# Patient Record
Sex: MALE | Race: WHITE | NOT HISPANIC OR LATINO
[De-identification: names, ages, dates, MRNs, and addresses within clinical notes are randomized per-mention and may not be internally consistent; named-entity substitution may affect disease eponyms.]

---

## 2017-03-16 ENCOUNTER — APPOINTMENT (OUTPATIENT)
Dept: UROLOGY | Facility: CLINIC | Age: 57
End: 2017-03-16

## 2017-03-16 VITALS
RESPIRATION RATE: 17 BRPM | DIASTOLIC BLOOD PRESSURE: 80 MMHG | BODY MASS INDEX: 32.58 KG/M2 | SYSTOLIC BLOOD PRESSURE: 127 MMHG | HEART RATE: 86 BPM | WEIGHT: 215 LBS | HEIGHT: 68 IN | TEMPERATURE: 98.2 F

## 2017-09-19 ENCOUNTER — APPOINTMENT (OUTPATIENT)
Dept: UROLOGY | Facility: CLINIC | Age: 57
End: 2017-09-19
Payer: COMMERCIAL

## 2017-09-19 VITALS
TEMPERATURE: 97.9 F | BODY MASS INDEX: 31.07 KG/M2 | WEIGHT: 205 LBS | SYSTOLIC BLOOD PRESSURE: 114 MMHG | HEART RATE: 66 BPM | HEIGHT: 68 IN | RESPIRATION RATE: 16 BRPM | DIASTOLIC BLOOD PRESSURE: 75 MMHG

## 2017-09-19 LAB
PSA FREE FLD-MCNC: 13.5
PSA FREE SERPL-MCNC: 1.29 NG/ML
PSA SERPL-MCNC: 9.57 NG/ML

## 2017-09-19 PROCEDURE — 99214 OFFICE O/P EST MOD 30 MIN: CPT

## 2017-10-03 ENCOUNTER — FORM ENCOUNTER (OUTPATIENT)
Age: 57
End: 2017-10-03

## 2017-10-04 ENCOUNTER — APPOINTMENT (OUTPATIENT)
Dept: MRI IMAGING | Facility: IMAGING CENTER | Age: 57
End: 2017-10-04
Payer: COMMERCIAL

## 2017-10-04 ENCOUNTER — OUTPATIENT (OUTPATIENT)
Dept: OUTPATIENT SERVICES | Facility: HOSPITAL | Age: 57
LOS: 1 days | End: 2017-10-04
Payer: COMMERCIAL

## 2017-10-04 DIAGNOSIS — R97.20 ELEVATED PROSTATE SPECIFIC ANTIGEN [PSA]: ICD-10-CM

## 2017-10-04 PROCEDURE — 82565 ASSAY OF CREATININE: CPT

## 2017-10-04 PROCEDURE — 72197 MRI PELVIS W/O & W/DYE: CPT

## 2017-10-04 PROCEDURE — 72197 MRI PELVIS W/O & W/DYE: CPT | Mod: 26

## 2017-10-04 PROCEDURE — A9585: CPT

## 2018-04-05 ENCOUNTER — APPOINTMENT (OUTPATIENT)
Dept: UROLOGY | Facility: CLINIC | Age: 58
End: 2018-04-05
Payer: COMMERCIAL

## 2018-04-05 VITALS
HEART RATE: 90 BPM | SYSTOLIC BLOOD PRESSURE: 147 MMHG | DIASTOLIC BLOOD PRESSURE: 76 MMHG | RESPIRATION RATE: 16 BRPM | TEMPERATURE: 98.1 F

## 2018-04-05 PROCEDURE — 99214 OFFICE O/P EST MOD 30 MIN: CPT

## 2018-04-16 LAB
PSA FREE FLD-MCNC: 13.6
PSA FREE SERPL-MCNC: 1.48 NG/ML
PSA SERPL-MCNC: 10.85 NG/ML

## 2018-07-10 ENCOUNTER — APPOINTMENT (OUTPATIENT)
Dept: UROLOGY | Facility: CLINIC | Age: 58
End: 2018-07-10
Payer: COMMERCIAL

## 2018-07-10 VITALS
SYSTOLIC BLOOD PRESSURE: 130 MMHG | HEART RATE: 69 BPM | RESPIRATION RATE: 16 BRPM | TEMPERATURE: 97.9 F | DIASTOLIC BLOOD PRESSURE: 79 MMHG

## 2018-07-10 LAB
PSA FREE FLD-MCNC: 14.8
PSA FREE SERPL-MCNC: 1.43 NG/ML
PSA SERPL-MCNC: 9.67 NG/ML

## 2018-07-10 PROCEDURE — 99213 OFFICE O/P EST LOW 20 MIN: CPT

## 2019-01-22 ENCOUNTER — APPOINTMENT (OUTPATIENT)
Dept: UROLOGY | Facility: CLINIC | Age: 59
End: 2019-01-22
Payer: COMMERCIAL

## 2019-01-22 VITALS
WEIGHT: 203 LBS | DIASTOLIC BLOOD PRESSURE: 75 MMHG | SYSTOLIC BLOOD PRESSURE: 142 MMHG | BODY MASS INDEX: 30.77 KG/M2 | HEIGHT: 68 IN | RESPIRATION RATE: 17 BRPM | TEMPERATURE: 98.3 F | HEART RATE: 84 BPM

## 2019-01-22 LAB
PSA FREE FLD-MCNC: 16 %
PSA FREE SERPL-MCNC: 1.48 NG/ML
PSA SERPL-MCNC: 9.39 NG/ML

## 2019-01-22 PROCEDURE — 99214 OFFICE O/P EST MOD 30 MIN: CPT

## 2019-01-22 NOTE — ASSESSMENT
[FreeTextEntry1] : Reviewed prior PSA results, MRI findings.\par Continues to have elevated PSA with low %free.\par Recommend repeat MRI prostate.  If continues to have suspicious foci, then will consider for repeat transperineal prostate biopsy.  Reviewed new biopsy technique and low risk of infection.\par All questions answered.\par Will call with results.

## 2019-01-22 NOTE — HISTORY OF PRESENT ILLNESS
[FreeTextEntry1] : s/p TRUS biopsy July 2015: benign\par s/p MRI/US fusion biopsy Aug 2016\par Pre biopsy PSA 7, abnormal MRI (volume 52 mL, PIRAD 4/3 lesions)\par Path: All cores benign, except for one core with atypical glands.  No definitive prostate cancer seen.\par \par MRI Oct 2017:  volume 39 mL, PIRAD 4 lesion unchanged from prior MRI.\par \par Returns for follow up.  PSA March 2018 10.85 ng/mL\par July 2018 9.67 ng/mL.\par January 2019: 9.39 ng/mL, 16% free.\par \par No new complaints.  Urinary function stable.\par No hematuria, dysuria or other s/s UTI.\par Remainder of medical hx unchanged.\par \par

## 2019-01-22 NOTE — PHYSICAL EXAM
[General Appearance - Well Developed] : well developed [General Appearance - Well Nourished] : well nourished [Normal Appearance] : normal appearance [Well Groomed] : well groomed [General Appearance - In No Acute Distress] : no acute distress [Abdomen Soft] : soft [Abdomen Tenderness] : non-tender [Costovertebral Angle Tenderness] : no ~M costovertebral angle tenderness [Urinary Bladder Findings] : the bladder was normal on palpation [No Prostate Nodules] : no prostate nodules [Prostate Size ___ gm] : prostate size [unfilled] gm [Edema] : no peripheral edema [] : no respiratory distress [Respiration, Rhythm And Depth] : normal respiratory rhythm and effort [Exaggerated Use Of Accessory Muscles For Inspiration] : no accessory muscle use [Normal Station and Gait] : the gait and station were normal for the patient's age

## 2019-02-06 ENCOUNTER — FORM ENCOUNTER (OUTPATIENT)
Age: 59
End: 2019-02-06

## 2019-02-07 ENCOUNTER — OUTPATIENT (OUTPATIENT)
Dept: OUTPATIENT SERVICES | Facility: HOSPITAL | Age: 59
LOS: 1 days | End: 2019-02-07
Payer: COMMERCIAL

## 2019-02-07 ENCOUNTER — APPOINTMENT (OUTPATIENT)
Dept: MRI IMAGING | Facility: IMAGING CENTER | Age: 59
End: 2019-02-07
Payer: COMMERCIAL

## 2019-02-07 DIAGNOSIS — R97.20 ELEVATED PROSTATE SPECIFIC ANTIGEN [PSA]: ICD-10-CM

## 2019-02-07 PROCEDURE — 72197 MRI PELVIS W/O & W/DYE: CPT | Mod: 26

## 2019-02-07 PROCEDURE — A9585: CPT

## 2019-02-07 PROCEDURE — 72197 MRI PELVIS W/O & W/DYE: CPT

## 2019-04-10 ENCOUNTER — APPOINTMENT (OUTPATIENT)
Dept: UROLOGY | Facility: AMBULATORY SURGERY CENTER | Age: 59
End: 2019-04-10

## 2019-04-17 ENCOUNTER — OUTPATIENT (OUTPATIENT)
Dept: OUTPATIENT SERVICES | Facility: HOSPITAL | Age: 59
LOS: 1 days | End: 2019-04-17
Payer: MEDICARE

## 2019-04-17 VITALS
SYSTOLIC BLOOD PRESSURE: 128 MMHG | RESPIRATION RATE: 16 BRPM | DIASTOLIC BLOOD PRESSURE: 76 MMHG | HEIGHT: 67 IN | HEART RATE: 63 BPM | TEMPERATURE: 98 F | WEIGHT: 201.94 LBS

## 2019-04-17 DIAGNOSIS — I10 ESSENTIAL (PRIMARY) HYPERTENSION: ICD-10-CM

## 2019-04-17 DIAGNOSIS — E11.9 TYPE 2 DIABETES MELLITUS WITHOUT COMPLICATIONS: ICD-10-CM

## 2019-04-17 DIAGNOSIS — R97.20 ELEVATED PROSTATE SPECIFIC ANTIGEN [PSA]: ICD-10-CM

## 2019-04-17 DIAGNOSIS — G47.33 OBSTRUCTIVE SLEEP APNEA (ADULT) (PEDIATRIC): ICD-10-CM

## 2019-04-17 LAB
ANION GAP SERPL CALC-SCNC: 16 MMO/L — HIGH (ref 7–14)
BUN SERPL-MCNC: 13 MG/DL — SIGNIFICANT CHANGE UP (ref 7–23)
CALCIUM SERPL-MCNC: 10 MG/DL — SIGNIFICANT CHANGE UP (ref 8.4–10.5)
CHLORIDE SERPL-SCNC: 102 MMOL/L — SIGNIFICANT CHANGE UP (ref 98–107)
CO2 SERPL-SCNC: 22 MMOL/L — SIGNIFICANT CHANGE UP (ref 22–31)
CREAT SERPL-MCNC: 0.97 MG/DL — SIGNIFICANT CHANGE UP (ref 0.5–1.3)
GLUCOSE SERPL-MCNC: 146 MG/DL — HIGH (ref 70–99)
HBA1C BLD-MCNC: 7.7 % — HIGH (ref 4–5.6)
HCT VFR BLD CALC: 46.6 % — SIGNIFICANT CHANGE UP (ref 39–50)
HGB BLD-MCNC: 14.1 G/DL — SIGNIFICANT CHANGE UP (ref 13–17)
MCHC RBC-ENTMCNC: 26.3 PG — LOW (ref 27–34)
MCHC RBC-ENTMCNC: 30.3 % — LOW (ref 32–36)
MCV RBC AUTO: 86.8 FL — SIGNIFICANT CHANGE UP (ref 80–100)
NRBC # FLD: 0 K/UL — SIGNIFICANT CHANGE UP (ref 0–0)
PLATELET # BLD AUTO: 240 K/UL — SIGNIFICANT CHANGE UP (ref 150–400)
PMV BLD: 12.5 FL — SIGNIFICANT CHANGE UP (ref 7–13)
POTASSIUM SERPL-MCNC: 4.2 MMOL/L — SIGNIFICANT CHANGE UP (ref 3.5–5.3)
POTASSIUM SERPL-SCNC: 4.2 MMOL/L — SIGNIFICANT CHANGE UP (ref 3.5–5.3)
RBC # BLD: 5.37 M/UL — SIGNIFICANT CHANGE UP (ref 4.2–5.8)
RBC # FLD: 12.2 % — SIGNIFICANT CHANGE UP (ref 10.3–14.5)
SODIUM SERPL-SCNC: 140 MMOL/L — SIGNIFICANT CHANGE UP (ref 135–145)
WBC # BLD: 7.65 K/UL — SIGNIFICANT CHANGE UP (ref 3.8–10.5)
WBC # FLD AUTO: 7.65 K/UL — SIGNIFICANT CHANGE UP (ref 3.8–10.5)

## 2019-04-17 PROCEDURE — 93010 ELECTROCARDIOGRAM REPORT: CPT

## 2019-04-17 NOTE — H&P PST ADULT - NSANTHOSAYNRD_GEN_A_CORE
No. CORTES screening performed.  STOP BANG Legend: 0-2 = LOW Risk; 3-4 = INTERMEDIATE Risk; 5-8 = HIGH Risk

## 2019-04-17 NOTE — H&P PST ADULT - HISTORY OF PRESENT ILLNESS
This is a 58 y.o. male with elevated PSA levels . Pt offers no complaints , now for transperineal MRI ultrasound fusion biopsy of the prostate .

## 2019-04-17 NOTE — H&P PST ADULT - TEACHING/LEARNING LEARNING PREFERENCES
individual instruction/pictorial/skill demonstration/written material/verbal instruction/video/computer/internet/audio/group instruction

## 2019-04-17 NOTE — H&P PST ADULT - NSICDXPROBLEM_GEN_ALL_CORE_FT
PROBLEM DIAGNOSES  Problem: Elevated prostate specific antigen (PSA)  Assessment and Plan: Transperineal MRI ultrasound fusion biopsy of the prostate     Problem: Hypertension  Assessment and Plan: pt instructed to take losartan day of surgery , monitor BP during hospital stay.    Problem: Diabetes mellitus  Assessment and Plan: type 2 , pt instructed to take last dose metformin 4/30 in am prior to surgery . Pt is npo from 11 pm the night before surgery . STAT FS upon admit , monitor FS during hospital stay . OR notified.    Problem: CORTES (obstructive sleep apnea)  Assessment and Plan: Stop bang positive ;   OR notified .

## 2019-04-17 NOTE — H&P PST ADULT - RS GEN PE MLT RESP DETAILS PC
no wheezes/clear to auscultation bilaterally/breath sounds equal/respirations non-labored/good air movement/no rales/no rhonchi

## 2019-04-17 NOTE — H&P PST ADULT - MUSCULOSKELETAL
details… detailed exam no joint warmth/no joint swelling/no calf tenderness/normal strength/no joint erythema/ROM intact

## 2019-04-17 NOTE — H&P PST ADULT - NS PRO FEM  PAP SMEARS 3YRS
[facility] [address]  [address] [address] [place] [address]  Phone: [phone]  Fax: [phone]    Bao Caraballo MD        October 31, 2018     Patient: Evelyne Swift   YOB: 1966   Date of Visit: 10/31/2018       To Whom it May Concern:    Evelyne Swift was seen in my clinic on 10/31/2018. Negative 0 mm TB test.    If you have any questions or concerns, please don't hesitate to call.     Sincerely,         Bao Caraballo MD
not applicable (Male)

## 2019-04-30 ENCOUNTER — TRANSCRIPTION ENCOUNTER (OUTPATIENT)
Age: 59
End: 2019-04-30

## 2019-05-01 ENCOUNTER — RESULT REVIEW (OUTPATIENT)
Age: 59
End: 2019-05-01

## 2019-05-01 ENCOUNTER — TRANSCRIPTION ENCOUNTER (OUTPATIENT)
Age: 59
End: 2019-05-01

## 2019-05-01 ENCOUNTER — APPOINTMENT (OUTPATIENT)
Dept: UROLOGY | Facility: AMBULATORY SURGERY CENTER | Age: 59
End: 2019-05-01

## 2019-05-01 ENCOUNTER — OUTPATIENT (OUTPATIENT)
Dept: OUTPATIENT SERVICES | Facility: HOSPITAL | Age: 59
LOS: 1 days | Discharge: HOME | End: 2019-05-01
Payer: COMMERCIAL

## 2019-05-01 VITALS
HEIGHT: 67 IN | DIASTOLIC BLOOD PRESSURE: 75 MMHG | SYSTOLIC BLOOD PRESSURE: 136 MMHG | HEART RATE: 59 BPM | WEIGHT: 201.94 LBS | RESPIRATION RATE: 18 BRPM | TEMPERATURE: 98 F | OXYGEN SATURATION: 100 %

## 2019-05-01 VITALS
TEMPERATURE: 98 F | SYSTOLIC BLOOD PRESSURE: 133 MMHG | DIASTOLIC BLOOD PRESSURE: 58 MMHG | HEART RATE: 78 BPM | OXYGEN SATURATION: 100 % | RESPIRATION RATE: 18 BRPM

## 2019-05-01 DIAGNOSIS — R97.20 ELEVATED PROSTATE SPECIFIC ANTIGEN [PSA]: ICD-10-CM

## 2019-05-01 LAB — GLUCOSE BLDC GLUCOMTR-MCNC: 122 MG/DL — HIGH (ref 70–99)

## 2019-05-01 PROCEDURE — 76872 US TRANSRECTAL: CPT | Mod: 26

## 2019-05-01 PROCEDURE — 88305 TISSUE EXAM BY PATHOLOGIST: CPT | Mod: 26

## 2019-05-01 PROCEDURE — 76942 ECHO GUIDE FOR BIOPSY: CPT | Mod: 26,59

## 2019-05-01 PROCEDURE — 88342 IMHCHEM/IMCYTCHM 1ST ANTB: CPT | Mod: 26

## 2019-05-01 PROCEDURE — 76377 3D RENDER W/INTRP POSTPROCES: CPT | Mod: 26

## 2019-05-01 PROCEDURE — 88341 IMHCHEM/IMCYTCHM EA ADD ANTB: CPT | Mod: 26

## 2019-05-01 PROCEDURE — 55706 BX PRST8 NDL SAT SAMPLING: CPT

## 2019-05-01 RX ORDER — SODIUM CHLORIDE 9 MG/ML
1000 INJECTION, SOLUTION INTRAVENOUS
Qty: 0 | Refills: 0 | Status: DISCONTINUED | OUTPATIENT
Start: 2019-05-01 | End: 2019-05-16

## 2019-05-01 RX ORDER — ASPIRIN/CALCIUM CARB/MAGNESIUM 324 MG
1 TABLET ORAL
Qty: 0 | Refills: 0 | COMMUNITY

## 2019-05-01 NOTE — ASU PREOP CHECKLIST - ISOLATION PRECAUTIONS
none

## 2019-05-01 NOTE — PROVIDER CONTACT NOTE (OTHER) - ACTION/TREATMENT ORDERED:
patient okay to be discharged from PACU, instructed patient to call Dr. Cavazos and head to ER if patient does not void in 6 hours.

## 2019-05-01 NOTE — ASU DISCHARGE PLAN (ADULT/PEDIATRIC) - MEDICATION INSTRUCTIONS
you were given tylenol in the operating room. you may take tylenol AFTER 10 pm today and every 6 horus after.

## 2019-05-01 NOTE — PROVIDER CONTACT NOTE (OTHER) - SITUATION
patient status post transperineal biopsy of prostate. patient has not voided, as per resident needed to void before discharge. [phone]

## 2019-05-01 NOTE — ASU DISCHARGE PLAN (ADULT/PEDIATRIC) - ASU DC SPECIAL INSTRUCTIONSFT
Dr. Cavazos will call you with your pathology results. Please call  if you have any questions.     Please temporarily stop aspirin. Please resume your aspirin on Saturday 5/4.    Please take Tylenol or Motrin for pain as needed.

## 2019-05-01 NOTE — ASU DISCHARGE PLAN (ADULT/PEDIATRIC) - PAIN MANAGEMENT
Prescriptions electronically submitted to pharmacy from [facility] Take over the counter pain medication

## 2019-05-01 NOTE — ASU PREOPERATIVE ASSESSMENT, ADULT ([facility] ONLY) - TEACHING/LEARNING LEARNING PREFERENCES
individual instruction/pictorial/skill demonstration/verbal instruction/computer/internet/group instruction/written material/audio/video

## 2019-05-09 LAB — SURGICAL PATHOLOGY STUDY: SIGNIFICANT CHANGE UP

## 2019-05-14 ENCOUNTER — OTHER (OUTPATIENT)
Age: 59
End: 2019-05-14

## 2019-05-15 PROBLEM — I10 ESSENTIAL (PRIMARY) HYPERTENSION: Chronic | Status: ACTIVE | Noted: 2019-04-17

## 2019-05-15 PROBLEM — E11.9 TYPE 2 DIABETES MELLITUS WITHOUT COMPLICATIONS: Chronic | Status: ACTIVE | Noted: 2019-04-17

## 2019-11-05 ENCOUNTER — APPOINTMENT (OUTPATIENT)
Dept: UROLOGY | Facility: CLINIC | Age: 59
End: 2019-11-05
Payer: COMMERCIAL

## 2019-11-05 VITALS
BODY MASS INDEX: 30.87 KG/M2 | DIASTOLIC BLOOD PRESSURE: 74 MMHG | WEIGHT: 203 LBS | HEART RATE: 65 BPM | TEMPERATURE: 98.1 F | SYSTOLIC BLOOD PRESSURE: 112 MMHG

## 2019-11-05 LAB
PSA FREE FLD-MCNC: 13 %
PSA FREE SERPL-MCNC: 1.58 NG/ML
PSA SERPL-MCNC: 12.2 NG/ML

## 2019-11-05 PROCEDURE — 99213 OFFICE O/P EST LOW 20 MIN: CPT

## 2019-11-05 NOTE — HISTORY OF PRESENT ILLNESS
[FreeTextEntry1] : s/p TRUS biopsy July 2015: benign\par s/p MRI/US fusion biopsy Aug 2016\par Pre biopsy PSA 7, abnormal MRI (volume 52 mL, PIRAD 4/3 lesions)\par Path: All cores benign, except for one core with atypical glands.  No definitive prostate cancer seen.\par \par MRI Oct 2017:  volume 39 mL, PIRAD 4 lesion unchanged from prior MRI.\par \par Returns for follow up.  PSA March 2018 10.85 ng/mL\par July 2018 9.67 ng/mL.\par January 2019: 9.39 ng/mL, 16% free.\par \par MRI Feb 2019: 45 mL volume, PIRAD 2\par \par PSA November 2019 12.2 ng/mL, 30% free.\par \par No other new complaints.  Urinary function mostly stable.  Has occasional dysuria and urgency but not overly bothersome.\par

## 2019-11-05 NOTE — ASSESSMENT
[FreeTextEntry1] : Reviewed PSA results.  Recommend start finasteride 5 mg once daily.  This should help his irritative voiding symptoms.  Repeat PSA in 6 months.  If he does not decline appropriately, then will consider repeat biopsy.\par Goals of medication reviewed.  Discussed the potential adverse effects of the medication.  Discussed the proper administration of the medication.\par

## 2020-07-07 ENCOUNTER — APPOINTMENT (OUTPATIENT)
Dept: UROLOGY | Facility: CLINIC | Age: 60
End: 2020-07-07
Payer: COMMERCIAL

## 2020-07-07 VITALS — TEMPERATURE: 98 F

## 2020-07-07 VITALS
WEIGHT: 205 LBS | HEIGHT: 68 IN | RESPIRATION RATE: 17 BRPM | HEART RATE: 93 BPM | SYSTOLIC BLOOD PRESSURE: 120 MMHG | BODY MASS INDEX: 31.07 KG/M2 | DIASTOLIC BLOOD PRESSURE: 78 MMHG

## 2020-07-07 LAB
PSA FREE FLD-MCNC: 10 %
PSA FREE SERPL-MCNC: 0.62 NG/ML
PSA SERPL-MCNC: 6.15 NG/ML

## 2020-07-07 PROCEDURE — 99213 OFFICE O/P EST LOW 20 MIN: CPT

## 2020-07-07 NOTE — ASSESSMENT
[FreeTextEntry1] : Doing well. \par Continue finasteride.\par Follow up in 6 months with repeat PSA f/t

## 2020-07-07 NOTE — HISTORY OF PRESENT ILLNESS
[FreeTextEntry1] : Fusion biopsy Aug 2016 \par Path: All cores benign, except for one core with atypical glands\par \par  PSA Nov 2019 12.2 ng/ml \par July 2020 6.15 ng/ml\par \par Currently on Finasteride, denies any urgency, denies any frequency. Denies any hematuria, dysuria or incontinence.  Urinary function has significant improved on medication\par

## 2020-10-01 ENCOUNTER — RX RENEWAL (OUTPATIENT)
Age: 60
End: 2020-10-01

## 2021-01-05 ENCOUNTER — APPOINTMENT (OUTPATIENT)
Dept: UROLOGY | Facility: CLINIC | Age: 61
End: 2021-01-05
Payer: COMMERCIAL

## 2021-01-05 VITALS
BODY MASS INDEX: 30.31 KG/M2 | HEART RATE: 96 BPM | SYSTOLIC BLOOD PRESSURE: 103 MMHG | TEMPERATURE: 97.5 F | DIASTOLIC BLOOD PRESSURE: 70 MMHG | WEIGHT: 200 LBS | RESPIRATION RATE: 17 BRPM | HEIGHT: 68 IN

## 2021-01-05 LAB
PSA FREE FLD-MCNC: 11 %
PSA FREE SERPL-MCNC: 0.74 NG/ML
PSA SERPL-MCNC: 6.67 NG/ML

## 2021-01-05 PROCEDURE — 99072 ADDL SUPL MATRL&STAF TM PHE: CPT

## 2021-01-05 PROCEDURE — 99213 OFFICE O/P EST LOW 20 MIN: CPT

## 2021-01-05 NOTE — ASSESSMENT
[FreeTextEntry1] : Continue finasteride 5 mg once daily.\par Rx renewed.\par Repeat PSA in 6 months, will call with results.\par Office visit in one year.

## 2021-01-05 NOTE — HISTORY OF PRESENT ILLNESS
[FreeTextEntry1] : Fusion biopsy Aug 2016 \par Path: All cores benign, except for one core with atypical glands\par \par  PSA Nov 2019 12.2 ng/ml \par July 2020 6.15 ng/ml\par December 2020 6.67 ng/mL \par \par Currently on Finasteride, denies any urgency, denies any frequency. Denies any hematuria, dysuria or incontinence. Reports urinary stream is good. Nocturia 0-1.

## 2021-10-19 ENCOUNTER — NON-APPOINTMENT (OUTPATIENT)
Age: 61
End: 2021-10-19

## 2022-01-10 ENCOUNTER — APPOINTMENT (OUTPATIENT)
Dept: UROLOGY | Facility: CLINIC | Age: 62
End: 2022-01-10
Payer: COMMERCIAL

## 2022-01-10 VITALS
WEIGHT: 200 LBS | RESPIRATION RATE: 17 BRPM | DIASTOLIC BLOOD PRESSURE: 80 MMHG | BODY MASS INDEX: 30.41 KG/M2 | HEART RATE: 93 BPM | SYSTOLIC BLOOD PRESSURE: 120 MMHG

## 2022-01-10 PROCEDURE — 99213 OFFICE O/P EST LOW 20 MIN: CPT

## 2022-01-10 RX ORDER — SITAGLIPTIN 50 MG/1
50 TABLET, FILM COATED ORAL
Qty: 30 | Refills: 0 | Status: ACTIVE | COMMUNITY
Start: 2020-11-04

## 2022-01-10 RX ORDER — HYDROCORTISONE 25 MG/G
2.5 CREAM TOPICAL
Qty: 30 | Refills: 0 | Status: ACTIVE | COMMUNITY
Start: 2022-01-04

## 2022-01-10 NOTE — HISTORY OF PRESENT ILLNESS
[FreeTextEntry1] : Patient is a 61 year old man, presents today for elevated PSA \par \par PSA January 4th 2022 6.50 ng/mL \par Sept 2021 PSA 6.09 ng/mL \par June 2021 5.9 ng/mL \par December 2020 6.6 ng/mL \par \par Reports stream is good. Remains on finasteride 5 mg daily. Denies any increased urgency or frequency. Denies any hematuria, dysuria or incontinence.

## 2022-01-10 NOTE — PHYSICAL EXAM
[General Appearance - Well Developed] : well developed [Normal Appearance] : normal appearance [Urethral Meatus] : meatus normal [Urinary Bladder Findings] : the bladder was normal on palpation [Testes Mass (___cm)] : there were no testicular masses [Heart Rate And Rhythm] : Heart rate and rhythm were normal [Edema] : no peripheral edema [] : no respiratory distress [Oriented To Time, Place, And Person] : oriented to person, place, and time [Affect] : the affect was normal [No Focal Deficits] : no focal deficits [No Prostate Nodules] : no prostate nodules

## 2022-01-10 NOTE — ASSESSMENT
[FreeTextEntry1] : Elevated PSA:  recommend MRI prostate w/wo IV contrast. Will call with results.\par \par BPH: continue finasteride 5 mg once daily.

## 2022-01-20 ENCOUNTER — APPOINTMENT (OUTPATIENT)
Dept: MRI IMAGING | Facility: IMAGING CENTER | Age: 62
End: 2022-01-20

## 2022-02-20 ENCOUNTER — RESULT REVIEW (OUTPATIENT)
Age: 62
End: 2022-02-20

## 2022-02-20 ENCOUNTER — APPOINTMENT (OUTPATIENT)
Dept: MRI IMAGING | Facility: IMAGING CENTER | Age: 62
End: 2022-02-20
Payer: COMMERCIAL

## 2022-02-20 ENCOUNTER — OUTPATIENT (OUTPATIENT)
Dept: OUTPATIENT SERVICES | Facility: HOSPITAL | Age: 62
LOS: 1 days | End: 2022-02-20
Payer: COMMERCIAL

## 2022-02-20 DIAGNOSIS — R97.20 ELEVATED PROSTATE SPECIFIC ANTIGEN [PSA]: ICD-10-CM

## 2022-02-20 PROCEDURE — 72197 MRI PELVIS W/O & W/DYE: CPT

## 2022-02-20 PROCEDURE — 76498P: CUSTOM | Mod: 26

## 2022-02-20 PROCEDURE — A9585: CPT

## 2022-02-20 PROCEDURE — 76498 UNLISTED MR PROCEDURE: CPT

## 2022-02-20 PROCEDURE — 72197 MRI PELVIS W/O & W/DYE: CPT | Mod: 26

## 2022-06-20 ENCOUNTER — RX RENEWAL (OUTPATIENT)
Age: 62
End: 2022-06-20

## 2022-10-18 ENCOUNTER — APPOINTMENT (OUTPATIENT)
Dept: UROLOGY | Facility: CLINIC | Age: 62
End: 2022-10-18

## 2022-10-18 VITALS
RESPIRATION RATE: 16 BRPM | DIASTOLIC BLOOD PRESSURE: 77 MMHG | TEMPERATURE: 98 F | BODY MASS INDEX: 30.62 KG/M2 | HEIGHT: 68 IN | WEIGHT: 202 LBS | SYSTOLIC BLOOD PRESSURE: 123 MMHG | HEART RATE: 97 BPM

## 2022-10-18 PROCEDURE — 99213 OFFICE O/P EST LOW 20 MIN: CPT

## 2022-10-18 NOTE — HISTORY OF PRESENT ILLNESS
[FreeTextEntry1] : Here for follow up.\par \par Fusion biopsy Aug 2016 \par Path: All cores benign, except for one core with atypical glands\par \par PSA Nov 2019 12.2 ng/ml \par July 2020 6.15 ng/ml\par December 2020 6.67 ng/mL \par September 2021 6.99 ng/mL\par September 2022: 7.59 ng/mL\par \par MRI prostate 2/20/2022 36 mL volume, no targetable lesions.\par \par BPH: Currently on Finasteride 5 mg once daily.\par Urinary function stable.\par Denies hematuria, dysuria or incontinence.

## 2022-10-18 NOTE — ASSESSMENT
[FreeTextEntry1] : Elevated PSA:  repeat PSA f/t in three months.\par JUAREZ: prostate normal, no nodules.\par Will call with PSA results, if continues to rise, then will need prostate biopsy.\par \par BPH: continue finasteride 5 mg once daily.

## 2022-10-18 NOTE — PHYSICAL EXAM
[General Appearance - Well Developed] : well developed [General Appearance - Well Nourished] : well nourished [Normal Appearance] : normal appearance [Well Groomed] : well groomed [General Appearance - In No Acute Distress] : no acute distress [Abdomen Soft] : soft [Abdomen Tenderness] : non-tender [Costovertebral Angle Tenderness] : no ~M costovertebral angle tenderness [No Prostate Nodules] : no prostate nodules [Prostate Size ___ gm] : prostate size [unfilled] gm

## 2023-01-19 ENCOUNTER — APPOINTMENT (OUTPATIENT)
Dept: UROLOGY | Facility: CLINIC | Age: 63
End: 2023-01-19
Payer: COMMERCIAL

## 2023-01-19 VITALS
WEIGHT: 205 LBS | SYSTOLIC BLOOD PRESSURE: 118 MMHG | DIASTOLIC BLOOD PRESSURE: 76 MMHG | HEART RATE: 97 BPM | RESPIRATION RATE: 16 BRPM | BODY MASS INDEX: 31.07 KG/M2 | HEIGHT: 68 IN | TEMPERATURE: 98.4 F

## 2023-01-19 DIAGNOSIS — N40.0 BENIGN PROSTATIC HYPERPLASIA WITHOUT LOWER URINARY TRACT SYMPMS: ICD-10-CM

## 2023-01-19 LAB
PSA FREE FLD-MCNC: 11 %
PSA FREE SERPL-MCNC: 0.87 NG/ML
PSA SERPL-MCNC: 7.95 NG/ML

## 2023-01-19 PROCEDURE — 99213 OFFICE O/P EST LOW 20 MIN: CPT

## 2023-01-22 NOTE — PHYSICAL EXAM
[General Appearance - Well Developed] : well developed [General Appearance - Well Nourished] : well nourished [Normal Appearance] : normal appearance [Well Groomed] : well groomed [General Appearance - In No Acute Distress] : no acute distress [Abdomen Soft] : soft [Abdomen Tenderness] : non-tender [Costovertebral Angle Tenderness] : no ~M costovertebral angle tenderness [Urinary Bladder Findings] : the bladder was normal on palpation [No Prostate Nodules] : no prostate nodules

## 2023-01-22 NOTE — HISTORY OF PRESENT ILLNESS
[FreeTextEntry1] : Here for 3 month follow up.\par \par Fusion biopsy Aug 2016 \par Path: All cores benign, except for one core with atypical glands\par \par PSA Nov 2019 12.2 ng/ml \par July 2020 6.15 ng/ml\par December 2020 6.67 ng/mL \par September 2021 6.99 ng/mL\par September 2022: 7.59 ng/mL\par January 2023: 7.95 ng/mL, 11% free\par \par MRI prostate 2/20/2022 36 mL volume, no targetable lesions.\par \par BPH: Currently on Finasteride 5 mg once daily.\par Urinary function stable.\par Denies hematuria, dysuria or incontinence.

## 2023-01-22 NOTE — ASSESSMENT
[FreeTextEntry1] : PSA results reviewed.\par PSA continues to rise while on finasteride.\par MRI prostate ordered. Will call with results.\par Will need either fusion or standard template prostate biopsy.\par

## 2023-02-10 ENCOUNTER — RESULT REVIEW (OUTPATIENT)
Age: 63
End: 2023-02-10

## 2023-02-10 ENCOUNTER — APPOINTMENT (OUTPATIENT)
Dept: MRI IMAGING | Facility: IMAGING CENTER | Age: 63
End: 2023-02-10
Payer: COMMERCIAL

## 2023-02-10 ENCOUNTER — OUTPATIENT (OUTPATIENT)
Dept: OUTPATIENT SERVICES | Facility: HOSPITAL | Age: 63
LOS: 1 days | End: 2023-02-10
Payer: COMMERCIAL

## 2023-02-10 DIAGNOSIS — R97.20 ELEVATED PROSTATE SPECIFIC ANTIGEN [PSA]: ICD-10-CM

## 2023-02-10 PROCEDURE — 76498P: CUSTOM | Mod: 26

## 2023-02-10 PROCEDURE — A9585: CPT

## 2023-02-10 PROCEDURE — 76498 UNLISTED MR PROCEDURE: CPT

## 2023-02-10 PROCEDURE — 72197 MRI PELVIS W/O & W/DYE: CPT | Mod: 26

## 2023-02-10 PROCEDURE — 72197 MRI PELVIS W/O & W/DYE: CPT

## 2023-02-18 ENCOUNTER — NON-APPOINTMENT (OUTPATIENT)
Age: 63
End: 2023-02-18

## 2023-03-14 ENCOUNTER — NON-APPOINTMENT (OUTPATIENT)
Age: 63
End: 2023-03-14

## 2023-03-21 ENCOUNTER — APPOINTMENT (OUTPATIENT)
Dept: UROLOGY | Facility: CLINIC | Age: 63
End: 2023-03-21
Payer: COMMERCIAL

## 2023-03-21 ENCOUNTER — OUTPATIENT (OUTPATIENT)
Dept: OUTPATIENT SERVICES | Facility: HOSPITAL | Age: 63
LOS: 1 days | End: 2023-03-21
Payer: COMMERCIAL

## 2023-03-21 VITALS
SYSTOLIC BLOOD PRESSURE: 121 MMHG | OXYGEN SATURATION: 99 % | HEART RATE: 98 BPM | TEMPERATURE: 97.5 F | DIASTOLIC BLOOD PRESSURE: 81 MMHG | RESPIRATION RATE: 16 BRPM

## 2023-03-21 DIAGNOSIS — R35.0 FREQUENCY OF MICTURITION: ICD-10-CM

## 2023-03-21 PROCEDURE — 76377 3D RENDER W/INTRP POSTPROCES: CPT | Mod: 26

## 2023-03-21 PROCEDURE — 55700: CPT

## 2023-03-21 PROCEDURE — 76942 ECHO GUIDE FOR BIOPSY: CPT | Mod: 26,59

## 2023-03-21 PROCEDURE — 55700: CPT | Mod: 22

## 2023-03-21 PROCEDURE — 76942 ECHO GUIDE FOR BIOPSY: CPT | Mod: 59

## 2023-03-24 ENCOUNTER — NON-APPOINTMENT (OUTPATIENT)
Age: 63
End: 2023-03-24

## 2023-03-28 DIAGNOSIS — R97.20 ELEVATED PROSTATE SPECIFIC ANTIGEN [PSA]: ICD-10-CM

## 2023-03-31 DIAGNOSIS — R97.20 ELEVATED PROSTATE, SPECIFIC ANTIGEN [PSA]: ICD-10-CM

## 2023-03-31 LAB — PROSTATE BIOPSY: NORMAL

## 2023-04-17 ENCOUNTER — APPOINTMENT (OUTPATIENT)
Dept: NUCLEAR MEDICINE | Facility: IMAGING CENTER | Age: 63
End: 2023-04-17
Payer: COMMERCIAL

## 2023-04-17 ENCOUNTER — OUTPATIENT (OUTPATIENT)
Dept: OUTPATIENT SERVICES | Facility: HOSPITAL | Age: 63
LOS: 1 days | End: 2023-04-17
Payer: COMMERCIAL

## 2023-04-17 DIAGNOSIS — Z00.8 ENCOUNTER FOR OTHER GENERAL EXAMINATION: ICD-10-CM

## 2023-04-17 DIAGNOSIS — C61 MALIGNANT NEOPLASM OF PROSTATE: ICD-10-CM

## 2023-04-17 PROCEDURE — 78816 PET IMAGE W/CT FULL BODY: CPT | Mod: 26

## 2023-04-17 PROCEDURE — A9595: CPT

## 2023-04-17 PROCEDURE — 78816 PET IMAGE W/CT FULL BODY: CPT

## 2023-05-04 ENCOUNTER — APPOINTMENT (OUTPATIENT)
Dept: UROLOGY | Facility: CLINIC | Age: 63
End: 2023-05-04
Payer: COMMERCIAL

## 2023-05-04 VITALS
BODY MASS INDEX: 31.07 KG/M2 | SYSTOLIC BLOOD PRESSURE: 132 MMHG | HEART RATE: 104 BPM | TEMPERATURE: 98.4 F | HEIGHT: 68 IN | RESPIRATION RATE: 17 BRPM | WEIGHT: 205 LBS | DIASTOLIC BLOOD PRESSURE: 76 MMHG

## 2023-05-04 PROCEDURE — 99215 OFFICE O/P EST HI 40 MIN: CPT

## 2023-05-04 RX ORDER — DIAZEPAM 5 MG/1
5 TABLET ORAL
Qty: 1 | Refills: 0 | Status: COMPLETED | COMMUNITY
Start: 2023-02-15 | End: 2023-05-04

## 2023-05-24 ENCOUNTER — OUTPATIENT (OUTPATIENT)
Dept: OUTPATIENT SERVICES | Facility: HOSPITAL | Age: 63
LOS: 1 days | Discharge: HOME | End: 2023-05-24

## 2023-05-24 NOTE — DISEASE MANAGEMENT
[1] : T1 [c] : c [0] : M0 [10-20] : 10 - 20 ng/mL [Biopsy with Fusion] : Patient had a biopsy with fusion on [8] : Template Biopsy Jake Score: 8 [7(4+3)] : Fusion Biopsy Bluefield Score: 7(4+3) [Biopsy results sent to PCP/Referring Physician] : Biopsy results sent to PCP/Referring Physician [] : Patient had no Bone Scan performed [BiopsyDate] : 3/21/2023 [TotalCores] : 14 [TotalPositiveCores] : 4 [MaxCoreInvolvement] : 40% [FreeTextEntry7] : MRI prostate 2/10/2023 37 mL volume, PIRAD 4 Right PZpm, PIRAD 4 Left PZpm.\par PSA at diagnosis 7.95 ng/mL on finasteride.\par \par PSMA PET/CT 4/17/2023: uptake in the prostate, no metastatic disease. [IIC] : IIC

## 2023-05-24 NOTE — HISTORY OF PRESENT ILLNESS
[FreeTextEntry1] : Here for prostate cancer treatment discussion.\par \par Fusion biopsy Aug 2016 \par Path: All cores benign, except for one core with atypical glands\par \par PSA Nov 2019 12.2 ng/ml \par July 2020 6.15 ng/ml\par December 2020 6.67 ng/mL \par September 2021 6.99 ng/mL\par September 2022: 7.59 ng/mL\par January 2023: 7.95 ng/mL, 11% free\par \par MRI prostate 2/20/2022 36 mL volume, no targetable lesions.\par MRI prostate 2/10/2023 37 mL volume, PIRAD 4 Right PZpm, PIRAD 4 Left PZpm.\par \par Fusion biopsy demonstrated Gl Gr 3/4 prostate cancer.\par No metastatic disease.

## 2023-05-24 NOTE — DISEASE MANAGEMENT
[1] : T1 [c] : c [0] : M0 [10-20] : 10 - 20 ng/mL [Biopsy with Fusion] : Patient had a biopsy with fusion on [8] : Template Biopsy Jake Score: 8 [7(4+3)] : Fusion Biopsy Esbon Score: 7(4+3) [Biopsy results sent to PCP/Referring Physician] : Biopsy results sent to PCP/Referring Physician [] : Patient had no Bone Scan performed [BiopsyDate] : 3/21/2023 [TotalCores] : 14 [TotalPositiveCores] : 4 [MaxCoreInvolvement] : 40% [FreeTextEntry7] : MRI prostate 2/10/2023 37 mL volume, PIRAD 4 Right PZpm, PIRAD 4 Left PZpm.\par PSA at diagnosis 7.95 ng/mL on finasteride.\par \par PSMA PET/CT 4/17/2023: uptake in the prostate, no metastatic disease. [IIC] : IIC

## 2023-06-08 ENCOUNTER — APPOINTMENT (OUTPATIENT)
Dept: RADIATION ONCOLOGY | Facility: CLINIC | Age: 63
End: 2023-06-08
Payer: COMMERCIAL

## 2023-06-08 VITALS
DIASTOLIC BLOOD PRESSURE: 83 MMHG | RESPIRATION RATE: 18 BRPM | OXYGEN SATURATION: 97 % | SYSTOLIC BLOOD PRESSURE: 140 MMHG | HEART RATE: 105 BPM | BODY MASS INDEX: 31.49 KG/M2 | WEIGHT: 207.12 LBS

## 2023-06-08 DIAGNOSIS — Z82.49 FAMILY HISTORY OF ISCHEMIC HEART DISEASE AND OTHER DISEASES OF THE CIRCULATORY SYSTEM: ICD-10-CM

## 2023-06-08 DIAGNOSIS — Z83.3 FAMILY HISTORY OF DIABETES MELLITUS: ICD-10-CM

## 2023-06-08 PROCEDURE — 99204 OFFICE O/P NEW MOD 45 MIN: CPT | Mod: 25

## 2023-06-18 NOTE — PHYSICAL EXAM
[Sclera] : the sclera and conjunctiva were normal [Outer Ear] : the ears and nose were normal in appearance [] : no respiratory distress [Heart Rate And Rhythm] : heart rate and rhythm were normal [Abdomen Soft] : soft [Nondistended] : nondistended [Normal] : oriented to person, place and time, the affect was normal, the mood was normal and not anxious

## 2023-06-18 NOTE — REVIEW OF SYSTEMS
[IPSS Score (0-40): ___] : IPSS score: [unfilled] [EPIC-CP Score (0-60): ___] : EPIC-CP score: [unfilled] [Negative] : Neurological [Urinary Incontinence: Grade 0] : Urinary Incontinence: Grade 0  [Urinary Retention: Grade 0] : Urinary Retention: Grade 0 [Constipation: Grade 0] : Constipation: Grade 0 [Diarrhea: Grade 0] : Diarrhea: Grade 0 [Proctitis: Grade 0] : Proctitis: Grade 0 [Urinary Tract Pain: Grade 0] : Urinary Tract Pain: Grade 0 [Urinary Urgency: Grade 1 - Present] : Urinary Urgency: Grade 1 - Present [Urinary Frequency: Grade 0] : Urinary Frequency: Grade 0 [Ejaculation Disorder: Grade 0] : Ejaculation Disorder: Grade 0 [Erectile Dysfunction: Grade 0] : Erectile Dysfunction: Grade 0 [Fever] : no fever [Chills] : no chills [Night Sweats] : no night sweats [Nocturia] : no nocturia [Confused] : no confusion [Dizziness] : no dizziness

## 2023-06-18 NOTE — DISEASE MANAGEMENT
[1] : T1 [c] : c [0] : M0 [0-10] : 0 -10 ng/mL [Biopsy with Fusion] : Patient had a biopsy with fusion on [8] : Template Biopsy Jake Score: 8 [] : Patient had a Prostate MRI [4] : 4 [IIC] : IIC [BiopsyDate] : 03/23 [MeasuredProstateVolume] : 37 [TotalCores] : 14 [TotalPositiveCores] : 5 [MaxCoreInvolvement] : 40%

## 2023-06-18 NOTE — HISTORY OF PRESENT ILLNESS
[FreeTextEntry1] : Mr. Ashraf is a 61 yo man with prostate cancer being evaluated for radiation therapy. \par \par Patient was referred to Dr. Cavazos initially in October 2014 for evaluation of elevated PSA of 4.9. The PSA increased to 13 and he underwent a prostate biopsy in 2015, that showed no cancer. Prostate MRI in 2016 showed 4 suspicious lesions. Prostate biopsy on 8/11/16 again showed no cancer. All targeted biopsies were benign, except for focal high grade PIN from lesion #3 which was in the right anterior peripheral zone. Prostate MRI in 2017 showed two lesions and relative stability. MRI in 2019 showed one lesion, PIRADS 2. MRI prostate in 3/2022 showed no targetable prostate lesion. PIRADS 2.\par \par PSA on 1/19/23 was 7.95 ng/mL, up from 6.15 on 7/6/20. Historical high was 12.20 on 11/4/19. . \par \par MRI prostate on 2/10/2023 showed a 37 mL prostate. A lesion was noted in the right, posterior medial base to mid-gland peripheral zone measuring 4 mm. A second lesions was noted in the left posterior  medial midgland to apex peripheral zone. Both lesions were PIRADS 4. There was no extracapsular extension. There was no seminal vesicle invasion and the neurovascular bundles were unremarkable. There was no pelvic adenopathy and no suspicious osseous lesions. \par \par Prostate biopsy on 3/21/23 showed prostate adenocarcinoma. Targeted biopsy of lesion 1 showed Worthington score 4+3=7 adenocarcinoma involving 21% of 1/4 cores, 80% Worthington 4 component; and lesion 2 showed benign tissue. Template biopsy showed Worthington 4+4=8 from the right posterior lateral apex in 28% of 1/1/ core and from the left anterior involving 5% of 1/1/ core. There was a Worthington 3+3=6 adenocarcinoma from the left posterior medial apex involving 28% of 1/2 cores and from the left lateral involv ing 40% of 1/1 core. The remaining cores were benign.\par \par PET PSMA on 4/17/23 showed foci of increased radiotracer activity in bilateral prostate gland, most intense in the right anterolateral base, extending to the apex, suggesting high PSMA expression. There was no evidence of PSMA-positive metastatic disease.\par \par Today, he feels overall well. Denies significant stress. Notes very mild frequency and intermittency of stream, but is not very bothered by this. IPSS 2 and EPIC 1.

## 2023-07-10 ENCOUNTER — OUTPATIENT (OUTPATIENT)
Dept: OUTPATIENT SERVICES | Facility: HOSPITAL | Age: 63
LOS: 1 days | End: 2023-07-10
Payer: COMMERCIAL

## 2023-07-10 VITALS
HEART RATE: 84 BPM | SYSTOLIC BLOOD PRESSURE: 130 MMHG | TEMPERATURE: 99 F | HEIGHT: 67 IN | DIASTOLIC BLOOD PRESSURE: 81 MMHG | RESPIRATION RATE: 16 BRPM | WEIGHT: 205.91 LBS | OXYGEN SATURATION: 98 %

## 2023-07-10 DIAGNOSIS — I10 ESSENTIAL (PRIMARY) HYPERTENSION: ICD-10-CM

## 2023-07-10 DIAGNOSIS — C61 MALIGNANT NEOPLASM OF PROSTATE: ICD-10-CM

## 2023-07-10 DIAGNOSIS — E11.9 TYPE 2 DIABETES MELLITUS WITHOUT COMPLICATIONS: ICD-10-CM

## 2023-07-10 LAB
A1C WITH ESTIMATED AVERAGE GLUCOSE RESULT: 9.7 % — HIGH (ref 4–5.6)
ALBUMIN SERPL ELPH-MCNC: 4.7 G/DL — SIGNIFICANT CHANGE UP (ref 3.3–5)
ALP SERPL-CCNC: 71 U/L — SIGNIFICANT CHANGE UP (ref 40–120)
ALT FLD-CCNC: 43 U/L — HIGH (ref 4–41)
ANION GAP SERPL CALC-SCNC: 13 MMOL/L — SIGNIFICANT CHANGE UP (ref 7–14)
AST SERPL-CCNC: 31 U/L — SIGNIFICANT CHANGE UP (ref 4–40)
BILIRUB SERPL-MCNC: 0.6 MG/DL — SIGNIFICANT CHANGE UP (ref 0.2–1.2)
BLD GP AB SCN SERPL QL: NEGATIVE — SIGNIFICANT CHANGE UP
BUN SERPL-MCNC: 8 MG/DL — SIGNIFICANT CHANGE UP (ref 7–23)
CALCIUM SERPL-MCNC: 9.8 MG/DL — SIGNIFICANT CHANGE UP (ref 8.4–10.5)
CHLORIDE SERPL-SCNC: 99 MMOL/L — SIGNIFICANT CHANGE UP (ref 98–107)
CO2 SERPL-SCNC: 26 MMOL/L — SIGNIFICANT CHANGE UP (ref 22–31)
CREAT SERPL-MCNC: 0.85 MG/DL — SIGNIFICANT CHANGE UP (ref 0.5–1.3)
EGFR: 98 ML/MIN/1.73M2 — SIGNIFICANT CHANGE UP
ESTIMATED AVERAGE GLUCOSE: 232 — SIGNIFICANT CHANGE UP
GLUCOSE SERPL-MCNC: 183 MG/DL — HIGH (ref 70–99)
HCT VFR BLD CALC: 46.3 % — SIGNIFICANT CHANGE UP (ref 39–50)
HGB BLD-MCNC: 14.5 G/DL — SIGNIFICANT CHANGE UP (ref 13–17)
MCHC RBC-ENTMCNC: 26.1 PG — LOW (ref 27–34)
MCHC RBC-ENTMCNC: 31.3 GM/DL — LOW (ref 32–36)
MCV RBC AUTO: 83.3 FL — SIGNIFICANT CHANGE UP (ref 80–100)
NRBC # BLD: 0 /100 WBCS — SIGNIFICANT CHANGE UP (ref 0–0)
NRBC # FLD: 0 K/UL — SIGNIFICANT CHANGE UP (ref 0–0)
PLATELET # BLD AUTO: 273 K/UL — SIGNIFICANT CHANGE UP (ref 150–400)
POTASSIUM SERPL-MCNC: 4.4 MMOL/L — SIGNIFICANT CHANGE UP (ref 3.5–5.3)
POTASSIUM SERPL-SCNC: 4.4 MMOL/L — SIGNIFICANT CHANGE UP (ref 3.5–5.3)
PROT SERPL-MCNC: 7.9 G/DL — SIGNIFICANT CHANGE UP (ref 6–8.3)
RBC # BLD: 5.56 M/UL — SIGNIFICANT CHANGE UP (ref 4.2–5.8)
RBC # FLD: 12.5 % — SIGNIFICANT CHANGE UP (ref 10.3–14.5)
RH IG SCN BLD-IMP: POSITIVE — SIGNIFICANT CHANGE UP
SODIUM SERPL-SCNC: 138 MMOL/L — SIGNIFICANT CHANGE UP (ref 135–145)
WBC # BLD: 9.65 K/UL — SIGNIFICANT CHANGE UP (ref 3.8–10.5)
WBC # FLD AUTO: 9.65 K/UL — SIGNIFICANT CHANGE UP (ref 3.8–10.5)

## 2023-07-10 PROCEDURE — 93010 ELECTROCARDIOGRAM REPORT: CPT

## 2023-07-10 RX ORDER — METFORMIN HYDROCHLORIDE 850 MG/1
1 TABLET ORAL
Qty: 0 | Refills: 0 | DISCHARGE

## 2023-07-10 RX ORDER — SODIUM CHLORIDE 9 MG/ML
1000 INJECTION, SOLUTION INTRAVENOUS
Refills: 0 | Status: DISCONTINUED | OUTPATIENT
Start: 2023-07-17 | End: 2023-07-17

## 2023-07-10 RX ORDER — MELOXICAM 15 MG/1
1 TABLET ORAL
Qty: 0 | Refills: 0 | DISCHARGE

## 2023-07-10 RX ORDER — SIMVASTATIN 20 MG/1
1 TABLET, FILM COATED ORAL
Qty: 0 | Refills: 0 | DISCHARGE

## 2023-07-10 RX ORDER — LOSARTAN POTASSIUM 100 MG/1
1 TABLET, FILM COATED ORAL
Qty: 0 | Refills: 0 | DISCHARGE

## 2023-07-10 RX ORDER — LORATADINE 10 MG/1
1 TABLET ORAL
Qty: 0 | Refills: 0 | DISCHARGE

## 2023-07-10 NOTE — H&P PST ADULT - PROBLEM SELECTOR PLAN 2
Pt instructed to continue losartan as prescribed. Pt instructed to continue losartan as prescribed.  CORTES precautions.

## 2023-07-10 NOTE — H&P PST ADULT - HISTORY OF PRESENT ILLNESS
63 yo male with preop dx. malignant neoplasm of prostate presents to pre surgical testing.  Pt with elevated PSA, now s/p prostate biopsy revealing malignancy.  Pt is scheduled for robotic assisted laparoscopic radical prostatectomy, pelvic lymph node dissection.

## 2023-07-10 NOTE — H&P PST ADULT - PROBLEM SELECTOR PLAN 1
Pt is scheduled for robotic assisted laparoscopic radical prostatectomy, pelvic lymph node dissection on 7/17/23.  Verbal and written pre op instructions reviewed with patient and pt able to verbalize understanding.   Verbal and written instructions given with chlorhexidine wash, pt able to verbalize understanding via teach back method.

## 2023-07-10 NOTE — H&P PST ADULT - NSICDXPASTMEDICALHX_GEN_ALL_CORE_FT
PAST MEDICAL HISTORY:  Diabetes mellitus     HLD (hyperlipidemia)     HTN (hypertension)     Hypertension     Malignant neoplasm of prostate

## 2023-07-10 NOTE — H&P PST ADULT - NEGATIVE NEUROLOGICAL SYMPTOMS
no transient paralysis/no weakness/no paresthesias/no generalized seizures/no focal seizures/no tremors/no vertigo/no loss of sensation/no difficulty walking

## 2023-07-11 LAB
CULTURE RESULTS: SIGNIFICANT CHANGE UP
SPECIMEN SOURCE: SIGNIFICANT CHANGE UP

## 2023-07-17 ENCOUNTER — INPATIENT (INPATIENT)
Facility: HOSPITAL | Age: 63
LOS: 0 days | Discharge: HOME | End: 2023-07-17
Attending: UROLOGY | Admitting: UROLOGY

## 2023-07-17 ENCOUNTER — APPOINTMENT (OUTPATIENT)
Dept: UROLOGY | Facility: HOSPITAL | Age: 63
End: 2023-07-17

## 2023-07-17 VITALS — HEIGHT: 67 IN | WEIGHT: 205.91 LBS

## 2023-07-17 DIAGNOSIS — C61 MALIGNANT NEOPLASM OF PROSTATE: ICD-10-CM

## 2023-07-17 LAB
GLUCOSE BLDC GLUCOMTR-MCNC: 144 MG/DL — HIGH (ref 70–99)
RH IG SCN BLD-IMP: POSITIVE — SIGNIFICANT CHANGE UP

## 2023-07-20 PROBLEM — C61 MALIGNANT NEOPLASM OF PROSTATE: Chronic | Status: ACTIVE | Noted: 2023-07-10

## 2023-07-20 PROBLEM — I10 ESSENTIAL (PRIMARY) HYPERTENSION: Chronic | Status: ACTIVE | Noted: 2023-07-10

## 2023-07-20 PROBLEM — E78.5 HYPERLIPIDEMIA, UNSPECIFIED: Chronic | Status: ACTIVE | Noted: 2023-07-10

## 2023-08-07 ENCOUNTER — OUTPATIENT (OUTPATIENT)
Dept: OUTPATIENT SERVICES | Facility: HOSPITAL | Age: 63
LOS: 1 days | End: 2023-08-07

## 2023-08-07 VITALS
RESPIRATION RATE: 18 BRPM | HEIGHT: 68 IN | DIASTOLIC BLOOD PRESSURE: 80 MMHG | WEIGHT: 199.96 LBS | HEART RATE: 81 BPM | OXYGEN SATURATION: 96 % | TEMPERATURE: 97 F | SYSTOLIC BLOOD PRESSURE: 128 MMHG

## 2023-08-07 DIAGNOSIS — I10 ESSENTIAL (PRIMARY) HYPERTENSION: ICD-10-CM

## 2023-08-07 DIAGNOSIS — Z91.89 OTHER SPECIFIED PERSONAL RISK FACTORS, NOT ELSEWHERE CLASSIFIED: ICD-10-CM

## 2023-08-07 DIAGNOSIS — E11.9 TYPE 2 DIABETES MELLITUS WITHOUT COMPLICATIONS: ICD-10-CM

## 2023-08-07 DIAGNOSIS — C61 MALIGNANT NEOPLASM OF PROSTATE: ICD-10-CM

## 2023-08-07 DIAGNOSIS — Z98.890 OTHER SPECIFIED POSTPROCEDURAL STATES: Chronic | ICD-10-CM

## 2023-08-07 LAB
A1C WITH ESTIMATED AVERAGE GLUCOSE RESULT: 8 % — HIGH (ref 4–5.6)
ALBUMIN SERPL ELPH-MCNC: 4.6 G/DL — SIGNIFICANT CHANGE UP (ref 3.3–5)
ALP SERPL-CCNC: 65 U/L — SIGNIFICANT CHANGE UP (ref 40–120)
ALT FLD-CCNC: 29 U/L — SIGNIFICANT CHANGE UP (ref 4–41)
ANION GAP SERPL CALC-SCNC: 12 MMOL/L — SIGNIFICANT CHANGE UP (ref 7–14)
AST SERPL-CCNC: 27 U/L — SIGNIFICANT CHANGE UP (ref 4–40)
BILIRUB SERPL-MCNC: 0.5 MG/DL — SIGNIFICANT CHANGE UP (ref 0.2–1.2)
BLD GP AB SCN SERPL QL: NEGATIVE — SIGNIFICANT CHANGE UP
BUN SERPL-MCNC: 8 MG/DL — SIGNIFICANT CHANGE UP (ref 7–23)
CALCIUM SERPL-MCNC: 9.7 MG/DL — SIGNIFICANT CHANGE UP (ref 8.4–10.5)
CHLORIDE SERPL-SCNC: 103 MMOL/L — SIGNIFICANT CHANGE UP (ref 98–107)
CO2 SERPL-SCNC: 25 MMOL/L — SIGNIFICANT CHANGE UP (ref 22–31)
CREAT SERPL-MCNC: 0.93 MG/DL — SIGNIFICANT CHANGE UP (ref 0.5–1.3)
EGFR: 93 ML/MIN/1.73M2 — SIGNIFICANT CHANGE UP
ESTIMATED AVERAGE GLUCOSE: 183 — SIGNIFICANT CHANGE UP
GLUCOSE SERPL-MCNC: 131 MG/DL — HIGH (ref 70–99)
POTASSIUM SERPL-MCNC: 4.4 MMOL/L — SIGNIFICANT CHANGE UP (ref 3.5–5.3)
POTASSIUM SERPL-SCNC: 4.4 MMOL/L — SIGNIFICANT CHANGE UP (ref 3.5–5.3)
PROT SERPL-MCNC: 7.7 G/DL — SIGNIFICANT CHANGE UP (ref 6–8.3)
RH IG SCN BLD-IMP: POSITIVE — SIGNIFICANT CHANGE UP
SODIUM SERPL-SCNC: 140 MMOL/L — SIGNIFICANT CHANGE UP (ref 135–145)

## 2023-08-07 RX ORDER — SODIUM CHLORIDE 9 MG/ML
3 INJECTION INTRAMUSCULAR; INTRAVENOUS; SUBCUTANEOUS EVERY 8 HOURS
Refills: 0 | Status: DISCONTINUED | OUTPATIENT
Start: 2023-08-17 | End: 2023-08-18

## 2023-08-07 RX ORDER — SITAGLIPTIN 50 MG/1
1 TABLET, FILM COATED ORAL
Refills: 0 | DISCHARGE

## 2023-08-07 RX ORDER — MILK THISTLE 150 MG
2 CAPSULE ORAL
Refills: 0 | DISCHARGE
End: 2023-07-10

## 2023-08-07 NOTE — H&P PST ADULT - PROBLEM SELECTOR PLAN 2
Monitor BS A.M of surgery. Pt instructed to hold Jardiance 72 hours prior to surgery, last dose 07/13/23. Pt also instructed not to take any diabetes medications on the day of surgery. Pt verbalized understanding.

## 2023-08-07 NOTE — H&P PST ADULT - RESPIRATORY
clear to auscultation bilaterally/no wheezes/no respiratory distress/airway patent/breath sounds equal/good air movement/respirations non-labored

## 2023-08-07 NOTE — H&P PST ADULT - PROBLEM SELECTOR PLAN 1
Schedule for robotic assisted laparoscopic radical prostatectomy, pelvic lymph node dissection tentatively on 08/17/23. Pre op instructions, famotidine, chlorhexidine gluconate soap given and explained. Pt verbalized understanding.

## 2023-08-07 NOTE — H&P PST ADULT - NEGATIVE ENMT SYMPTOMS
Dr Neal Moyer requested arom  cx 6cm/80/-1 vtx  arom clear no hearing difficulty/no ear pain/no tinnitus/no vertigo/no sinus symptoms/no nasal congestion/no nose bleeds/no throat pain/no dysphagia

## 2023-08-07 NOTE — H&P PST ADULT - HISTORY OF PRESENT ILLNESS
61 y/o male with H/O: HTN, DM type2, HLD    preop dx. malignant neoplasm of prostate presents to pre surgical testing.  Pt with elevated PSA, now s/p prostate biopsy revealing malignancy.  Pt is scheduled for robotic assisted laparoscopic radical prostatectomy, pelvic lymph node dissection.  61 y/o male with H/O: HTN, DM type2, HLD presents to PST for pre op evaluation with h/o elevated PSA, s/p prostate biopsy revealing malignancy. Pre op diagnosis: malignant neoplasm of prostate. Now schedule for robotic assisted laparoscopic radical prostatectomy, pelvic lymph node dissection tentatively on 08/17/23.  Pt was initially scheduled for same surgery last month, as per pt, surgery was postponed because he was started on Jardiance after PST visit and took it on the day of surgery.

## 2023-08-07 NOTE — H&P PST ADULT - NSICDXPASTSURGICALHX_GEN_ALL_CORE_FT
PAST SURGICAL HISTORY:  No significant past surgical history PAST SURGICAL HISTORY:  H/O prostate biopsy

## 2023-08-07 NOTE — H&P PST ADULT - NEGATIVE NEUROLOGICAL SYMPTOMS
no transient paralysis/no weakness/no paresthesias/no generalized seizures/no focal seizures/no tremors/no vertigo/no loss of sensation/no difficulty walking/no headache/no confusion

## 2023-08-09 LAB
CULTURE RESULTS: SIGNIFICANT CHANGE UP
SPECIMEN SOURCE: SIGNIFICANT CHANGE UP

## 2023-08-12 NOTE — ASU DISCHARGE PLAN (ADULT/PEDIATRIC) - CALL YOUR DOCTOR IF YOU HAVE ANY OF THE FOLLOWING:
Fever greater than (need to indicate Fahrenheit or Celsius)/Bleeding that does not stop Fever greater than (need to indicate Fahrenheit or Celsius)/Bleeding that does not stop/Unable to urinate No

## 2023-08-16 ENCOUNTER — TRANSCRIPTION ENCOUNTER (OUTPATIENT)
Age: 63
End: 2023-08-16

## 2023-08-16 NOTE — ASU PATIENT PROFILE, ADULT - FALL HARM RISK - UNIVERSAL INTERVENTIONS
Bed in lowest position, wheels locked, appropriate side rails in place/Call bell, personal items and telephone in reach/Instruct patient to call for assistance before getting out of bed or chair/Non-slip footwear when patient is out of bed/[place] to call system/Physically safe environment - no spills, clutter or unnecessary equipment/Purposeful Proactive Rounding/Room/bathroom lighting operational, light cord in reach

## 2023-08-16 NOTE — ASU PATIENT PROFILE, ADULT - ANESTHESIA, PREVIOUS REACTION, PROFILE
[de-identified] : Pt returns for follow up for her right knee pain, pt was denied for her Synvisc injections. Evidently even though the pt has  a dx. of tri compartmental osteo arthritis. and her adult daily living is being compromised, because of the pain she is experiencing, in normal daily activities she is requesting an injection.\par \par Under sterile technique using ultrasound guided needle placement the patient's right knee was injected with 40 mg of cortisone with lidocaine. The patient tolerated the procedure well and was discharged with no immediate complications. Patient was given appropriate followup instructions accordingly. none

## 2023-08-17 ENCOUNTER — APPOINTMENT (OUTPATIENT)
Dept: UROLOGY | Facility: HOSPITAL | Age: 63
End: 2023-08-17

## 2023-08-17 ENCOUNTER — RESULT REVIEW (OUTPATIENT)
Age: 63
End: 2023-08-17

## 2023-08-17 ENCOUNTER — INPATIENT (INPATIENT)
Facility: HOSPITAL | Age: 63
LOS: 0 days | Discharge: HOME | End: 2023-08-18
Attending: UROLOGY | Admitting: UROLOGY
Payer: COMMERCIAL

## 2023-08-17 VITALS
OXYGEN SATURATION: 97 % | RESPIRATION RATE: 18 BRPM | HEART RATE: 78 BPM | HEIGHT: 68 IN | WEIGHT: 199.96 LBS | SYSTOLIC BLOOD PRESSURE: 114 MMHG | TEMPERATURE: 98 F | DIASTOLIC BLOOD PRESSURE: 74 MMHG

## 2023-08-17 DIAGNOSIS — C61 MALIGNANT NEOPLASM OF PROSTATE: ICD-10-CM

## 2023-08-17 DIAGNOSIS — Z98.890 OTHER SPECIFIED POSTPROCEDURAL STATES: Chronic | ICD-10-CM

## 2023-08-17 LAB
GLUCOSE BLDC GLUCOMTR-MCNC: 145 MG/DL — HIGH (ref 70–99)
GLUCOSE BLDC GLUCOMTR-MCNC: 149 MG/DL — HIGH (ref 70–99)
GLUCOSE BLDC GLUCOMTR-MCNC: 179 MG/DL — HIGH (ref 70–99)
GLUCOSE BLDC GLUCOMTR-MCNC: 198 MG/DL — HIGH (ref 70–99)
GLUCOSE BLDC GLUCOMTR-MCNC: 215 MG/DL — HIGH (ref 70–99)

## 2023-08-17 PROCEDURE — 55866 LAPS SURG PRST8ECT RPBIC RAD: CPT

## 2023-08-17 PROCEDURE — 88309 TISSUE EXAM BY PATHOLOGIST: CPT | Mod: 26

## 2023-08-17 PROCEDURE — 88307 TISSUE EXAM BY PATHOLOGIST: CPT | Mod: 26

## 2023-08-17 PROCEDURE — 88305 TISSUE EXAM BY PATHOLOGIST: CPT | Mod: 26

## 2023-08-17 PROCEDURE — 38571 LAPAROSCOPY LYMPHADENECTOMY: CPT

## 2023-08-17 DEVICE — LIGATING CLIPS WECK HEMOLOK POLYMER LARGE (PURPLE) 6: Type: IMPLANTABLE DEVICE | Status: FUNCTIONAL

## 2023-08-17 DEVICE — SURGICEL 2 X 14": Type: IMPLANTABLE DEVICE | Status: FUNCTIONAL

## 2023-08-17 RX ORDER — SENNA PLUS 8.6 MG/1
2 TABLET ORAL AT BEDTIME
Refills: 0 | Status: DISCONTINUED | OUTPATIENT
Start: 2023-08-17 | End: 2023-08-18

## 2023-08-17 RX ORDER — GLUCAGON INJECTION, SOLUTION 0.5 MG/.1ML
1 INJECTION, SOLUTION SUBCUTANEOUS ONCE
Refills: 0 | Status: DISCONTINUED | OUTPATIENT
Start: 2023-08-17 | End: 2023-08-18

## 2023-08-17 RX ORDER — HYDROMORPHONE HYDROCHLORIDE 2 MG/ML
0.5 INJECTION INTRAMUSCULAR; INTRAVENOUS; SUBCUTANEOUS
Refills: 0 | Status: DISCONTINUED | OUTPATIENT
Start: 2023-08-17 | End: 2023-08-17

## 2023-08-17 RX ORDER — SODIUM CHLORIDE 9 MG/ML
1000 INJECTION, SOLUTION INTRAVENOUS
Refills: 0 | Status: DISCONTINUED | OUTPATIENT
Start: 2023-08-17 | End: 2023-08-18

## 2023-08-17 RX ORDER — OXYBUTYNIN CHLORIDE 5 MG
5 TABLET ORAL THREE TIMES A DAY
Refills: 0 | Status: DISCONTINUED | OUTPATIENT
Start: 2023-08-17 | End: 2023-08-18

## 2023-08-17 RX ORDER — INSULIN LISPRO 100/ML
VIAL (ML) SUBCUTANEOUS AT BEDTIME
Refills: 0 | Status: DISCONTINUED | OUTPATIENT
Start: 2023-08-17 | End: 2023-08-18

## 2023-08-17 RX ORDER — DEXTROSE 50 % IN WATER 50 %
25 SYRINGE (ML) INTRAVENOUS ONCE
Refills: 0 | Status: DISCONTINUED | OUTPATIENT
Start: 2023-08-17 | End: 2023-08-18

## 2023-08-17 RX ORDER — METFORMIN HYDROCHLORIDE 850 MG/1
1 TABLET ORAL
Refills: 0 | DISCHARGE

## 2023-08-17 RX ORDER — SIMVASTATIN 20 MG/1
20 TABLET, FILM COATED ORAL AT BEDTIME
Refills: 0 | Status: DISCONTINUED | OUTPATIENT
Start: 2023-08-17 | End: 2023-08-18

## 2023-08-17 RX ORDER — KETOROLAC TROMETHAMINE 30 MG/ML
15 SYRINGE (ML) INJECTION EVERY 6 HOURS
Refills: 0 | Status: DISCONTINUED | OUTPATIENT
Start: 2023-08-17 | End: 2023-08-17

## 2023-08-17 RX ORDER — LIDOCAINE 4 G/100G
1 CREAM TOPICAL
Refills: 0 | Status: DISCONTINUED | OUTPATIENT
Start: 2023-08-17 | End: 2023-08-18

## 2023-08-17 RX ORDER — SIMVASTATIN 20 MG/1
1 TABLET, FILM COATED ORAL
Refills: 0 | DISCHARGE

## 2023-08-17 RX ORDER — POLYETHYLENE GLYCOL 3350 17 G/17G
17 POWDER, FOR SOLUTION ORAL DAILY
Refills: 0 | Status: DISCONTINUED | OUTPATIENT
Start: 2023-08-17 | End: 2023-08-18

## 2023-08-17 RX ORDER — SODIUM CHLORIDE 9 MG/ML
1000 INJECTION, SOLUTION INTRAVENOUS
Refills: 0 | Status: DISCONTINUED | OUTPATIENT
Start: 2023-08-17 | End: 2023-08-17

## 2023-08-17 RX ORDER — DEXTROSE 50 % IN WATER 50 %
12.5 SYRINGE (ML) INTRAVENOUS ONCE
Refills: 0 | Status: DISCONTINUED | OUTPATIENT
Start: 2023-08-17 | End: 2023-08-18

## 2023-08-17 RX ORDER — LOSARTAN POTASSIUM 100 MG/1
100 TABLET, FILM COATED ORAL DAILY
Refills: 0 | Status: DISCONTINUED | OUTPATIENT
Start: 2023-08-18 | End: 2023-08-18

## 2023-08-17 RX ORDER — ONDANSETRON 8 MG/1
4 TABLET, FILM COATED ORAL ONCE
Refills: 0 | Status: DISCONTINUED | OUTPATIENT
Start: 2023-08-17 | End: 2023-08-17

## 2023-08-17 RX ORDER — INSULIN LISPRO 100/ML
VIAL (ML) SUBCUTANEOUS
Refills: 0 | Status: DISCONTINUED | OUTPATIENT
Start: 2023-08-17 | End: 2023-08-18

## 2023-08-17 RX ORDER — OXYCODONE HYDROCHLORIDE 5 MG/1
5 TABLET ORAL EVERY 6 HOURS
Refills: 0 | Status: DISCONTINUED | OUTPATIENT
Start: 2023-08-17 | End: 2023-08-18

## 2023-08-17 RX ORDER — DEXTROSE 50 % IN WATER 50 %
15 SYRINGE (ML) INTRAVENOUS ONCE
Refills: 0 | Status: DISCONTINUED | OUTPATIENT
Start: 2023-08-17 | End: 2023-08-18

## 2023-08-17 RX ORDER — ACETAMINOPHEN 500 MG
975 TABLET ORAL EVERY 6 HOURS
Refills: 0 | Status: DISCONTINUED | OUTPATIENT
Start: 2023-08-17 | End: 2023-08-18

## 2023-08-17 RX ORDER — SITAGLIPTIN 50 MG/1
1 TABLET, FILM COATED ORAL
Refills: 0 | DISCHARGE

## 2023-08-17 RX ORDER — HEPARIN SODIUM 5000 [USP'U]/ML
5000 INJECTION INTRAVENOUS; SUBCUTANEOUS EVERY 8 HOURS
Refills: 0 | Status: DISCONTINUED | OUTPATIENT
Start: 2023-08-17 | End: 2023-08-18

## 2023-08-17 RX ORDER — KETOROLAC TROMETHAMINE 30 MG/ML
15 SYRINGE (ML) INJECTION EVERY 6 HOURS
Refills: 0 | Status: COMPLETED | OUTPATIENT
Start: 2023-08-17 | End: 2023-08-19

## 2023-08-17 RX ADMIN — HEPARIN SODIUM 5000 UNIT(S): 5000 INJECTION INTRAVENOUS; SUBCUTANEOUS at 17:21

## 2023-08-17 RX ADMIN — LIDOCAINE 1 APPLICATION(S): 4 CREAM TOPICAL at 17:21

## 2023-08-17 RX ADMIN — SIMVASTATIN 20 MILLIGRAM(S): 20 TABLET, FILM COATED ORAL at 22:09

## 2023-08-17 RX ADMIN — SENNA PLUS 2 TABLET(S): 8.6 TABLET ORAL at 22:08

## 2023-08-17 RX ADMIN — LIDOCAINE 1 APPLICATION(S): 4 CREAM TOPICAL at 22:09

## 2023-08-17 RX ADMIN — Medication 2: at 15:18

## 2023-08-17 RX ADMIN — SODIUM CHLORIDE 3 MILLILITER(S): 9 INJECTION INTRAMUSCULAR; INTRAVENOUS; SUBCUTANEOUS at 14:09

## 2023-08-17 RX ADMIN — Medication 975 MILLIGRAM(S): at 17:22

## 2023-08-17 RX ADMIN — SODIUM CHLORIDE 3 MILLILITER(S): 9 INJECTION INTRAMUSCULAR; INTRAVENOUS; SUBCUTANEOUS at 22:09

## 2023-08-17 RX ADMIN — POLYETHYLENE GLYCOL 3350 17 GRAM(S): 17 POWDER, FOR SOLUTION ORAL at 17:22

## 2023-08-17 RX ADMIN — Medication 975 MILLIGRAM(S): at 17:52

## 2023-08-17 RX ADMIN — Medication 15 MILLIGRAM(S): at 18:04

## 2023-08-17 NOTE — PATIENT PROFILE ADULT - FALL HARM RISK - HARM RISK INTERVENTIONS

## 2023-08-17 NOTE — PRE-OP CHECKLIST - BP NONINVASIVE SYSTOLIC (MM HG)
08/03/21        Barrington Celaya  [id number]  [facility] [address]        Dear Barrington Celaya    We are pleased to report normal/stable results from the following studies performed during your last visit. The ultrasound notes a solitary thyroid nodule(Location: left lower; Size: 1.1 cm. It does not need or qualify for biopsy however we should repeat the ultrasound in a year to make sure that has not grown in size.    Resulted Orders   US THYROID ONLY    Narrative    EXAM: US THYROID ONLY    CLINICAL INFORMATION: Nontoxic single thyroid nodule    TECHNIQUE: Multiple grayscale and color Doppler images of the thyroid.     COMPARISON: None.    FINDINGS:    Right Thyroid Lobe:     Size: 4.3 x 1.2 x 1.4 cm.     Echotexture/Vascularity: Normal.    Left Thyroid Lobe:     Size: 4.4 x 1.5 x 1.1 cm.      Echotexture/Vascularity: Normal.    Isthmus: 2.1 cm.      NODULES:  Based on TI-RADS criteria, only the 4 most suspicious nodules will be  described.    ------------------------------    Nodule 1:        Label on Images: 1       Location: left lower       Size: 1.1 cm       Composition: solid/almost completely solid (2)       Echogenicity: hypoechoic (2)       Shape: not taller-than-wide (0)       Margins: smooth (0)        Echogenic foci: None. (0)         ACR TI-RADS total points: 4       ACR TI-RADS risk category: TR4 (4-6 Points)    Abnormal lymph nodes or extra-thyroidal mass: None.    ---------------------------------  ACR TI-RADS recommendations:  TR5 (>= 7 points) - FNA if >= 1 cm, follow-up if 0.5-0.9 cm every year for  5 years  TR4 (4-6 points) - FNA if >= 1.5 cm, follow-up if 1-1.4 cm in 1, 2, 3 and 5  years  TR3 (3 points) - FNA if >= 2.5 cm, follow-up if 1.5-2.4 cm in 1, 3 and 5  years  TR2 (2 points) and TR1 (0 points) - No FNA or follow-up    * ACR TI-RADS recommends that no more than two nodules with the highest ACR  TI-RADS total point should be biopsied and no more than four nodules should  be  followed.    *Clinically significant interval growth defined as >20% increase size in  two nodule dimension and the change in size must be greater than or equal  to 2mm.    NORMAN Hair, et al, 'ACR Thyroid Imaging, Reporting, and Data System  (TI-RADS): White Paper of the ACR TI-RADS Committee', March 2017.  ------------------------------------------        Impression    IMPRESSION:     Small solitary nonspecific thyroid nodule on the left. This nodule does not  meet TI-RADS criteria for FNA, but does meet criteria for one year imaging  follow-up.         [x] Continue on your current medications          If you have any questions about your test results please call our office Monday through Friday from 8:00 am. to 5:00 pm.    Sincerely,    Jere Hernandez MD  [address] [place] [address] [address] [place] [address] [phone]                                         114

## 2023-08-17 NOTE — CHART NOTE - NSCHARTNOTEFT_GEN_A_CORE
Post op Check: 63 yo M POD #0 RALP/LND    Pt seen and examined without complaints. Pain is controlled. Denies SOB/CP/N/V.     Vital Signs Last 24 Hrs  T(C): 36.7 (17 Aug 2023 15:57), Max: 36.7 (17 Aug 2023 15:57)  T(F): 98 (17 Aug 2023 15:57), Max: 98 (17 Aug 2023 15:57)  HR: 78 (17 Aug 2023 15:57) (71 - 78)  BP: 97/49 (17 Aug 2023 15:57) (96/52 - 114/74)  BP(mean): 73 (17 Aug 2023 15:00) (63 - 77)  RR: 18 (17 Aug 2023 15:57) (15 - 18)  SpO2: 98% (17 Aug 2023 15:57) (95% - 100%)    Parameters below as of 17 Aug 2023 15:57  Patient On (Oxygen Delivery Method): room air        I&Os Summary  Montes: 145 light translucent punch  17 Aug 2023 07:01  -  17 Aug 2023 16:42  --------------------------------------------------------  IN: 250 mL / OUT: 145 mL / NET: 105 mL        Physical Exam  Gen: NAD  Pulm: No respiratory distress, clear to auscultation  CV: Reg  Abd: midline dressing with small amount of strike through, steris c/d/i soft, NT, ND  : montes secured  Venodynes: In place                Plan:   IVF: LR @125  Diet: CLD  Labs: None  Abx: None  Strict I&Os  Analgesia and antiemetics as needed  DVT prophylaxis/OOB/Incentive spirometry

## 2023-08-18 ENCOUNTER — TRANSCRIPTION ENCOUNTER (OUTPATIENT)
Age: 63
End: 2023-08-18

## 2023-08-18 VITALS
HEART RATE: 78 BPM | DIASTOLIC BLOOD PRESSURE: 66 MMHG | RESPIRATION RATE: 18 BRPM | OXYGEN SATURATION: 98 % | SYSTOLIC BLOOD PRESSURE: 107 MMHG | TEMPERATURE: 98 F

## 2023-08-18 LAB
GLUCOSE BLDC GLUCOMTR-MCNC: 125 MG/DL — HIGH (ref 70–99)
GLUCOSE BLDC GLUCOMTR-MCNC: 166 MG/DL — HIGH (ref 70–99)
GLUCOSE BLDC GLUCOMTR-MCNC: 173 MG/DL — HIGH (ref 70–99)

## 2023-08-18 RX ORDER — IBUPROFEN 200 MG
2 TABLET ORAL
Qty: 0 | Refills: 0 | DISCHARGE

## 2023-08-18 RX ORDER — ACETAMINOPHEN 500 MG
3 TABLET ORAL
Qty: 0 | Refills: 0 | DISCHARGE
Start: 2023-08-18

## 2023-08-18 RX ORDER — SODIUM CHLORIDE 9 MG/ML
1000 INJECTION, SOLUTION INTRAVENOUS ONCE
Refills: 0 | Status: COMPLETED | OUTPATIENT
Start: 2023-08-18 | End: 2023-08-18

## 2023-08-18 RX ORDER — FINASTERIDE 5 MG/1
1 TABLET, FILM COATED ORAL
Refills: 0 | DISCHARGE

## 2023-08-18 RX ORDER — EMPAGLIFLOZIN 10 MG/1
1 TABLET, FILM COATED ORAL
Qty: 0 | Refills: 0 | DISCHARGE

## 2023-08-18 RX ORDER — LOSARTAN POTASSIUM 100 MG/1
1 TABLET, FILM COATED ORAL
Refills: 0 | DISCHARGE

## 2023-08-18 RX ORDER — LIDOCAINE 4 G/100G
1 CREAM TOPICAL
Qty: 0 | Refills: 0 | DISCHARGE
Start: 2023-08-18

## 2023-08-18 RX ORDER — SODIUM CHLORIDE 9 MG/ML
1000 INJECTION, SOLUTION INTRAVENOUS
Refills: 0 | Status: DISCONTINUED | OUTPATIENT
Start: 2023-08-18 | End: 2023-08-18

## 2023-08-18 RX ORDER — POLYETHYLENE GLYCOL 3350 17 G/17G
17 POWDER, FOR SOLUTION ORAL
Qty: 0 | Refills: 0 | DISCHARGE
Start: 2023-08-18

## 2023-08-18 RX ADMIN — SODIUM CHLORIDE 3 MILLILITER(S): 9 INJECTION INTRAMUSCULAR; INTRAVENOUS; SUBCUTANEOUS at 13:02

## 2023-08-18 RX ADMIN — LIDOCAINE 1 APPLICATION(S): 4 CREAM TOPICAL at 03:42

## 2023-08-18 RX ADMIN — Medication 975 MILLIGRAM(S): at 14:05

## 2023-08-18 RX ADMIN — Medication 15 MILLIGRAM(S): at 15:40

## 2023-08-18 RX ADMIN — LIDOCAINE 1 APPLICATION(S): 4 CREAM TOPICAL at 06:58

## 2023-08-18 RX ADMIN — Medication 15 MILLIGRAM(S): at 04:42

## 2023-08-18 RX ADMIN — SODIUM CHLORIDE 1000 MILLILITER(S): 9 INJECTION, SOLUTION INTRAVENOUS at 01:56

## 2023-08-18 RX ADMIN — Medication 975 MILLIGRAM(S): at 13:05

## 2023-08-18 RX ADMIN — Medication 975 MILLIGRAM(S): at 18:27

## 2023-08-18 RX ADMIN — Medication 1: at 11:24

## 2023-08-18 RX ADMIN — Medication 1: at 16:59

## 2023-08-18 RX ADMIN — Medication 15 MILLIGRAM(S): at 03:42

## 2023-08-18 RX ADMIN — POLYETHYLENE GLYCOL 3350 17 GRAM(S): 17 POWDER, FOR SOLUTION ORAL at 13:05

## 2023-08-18 RX ADMIN — Medication 975 MILLIGRAM(S): at 00:37

## 2023-08-18 RX ADMIN — SODIUM CHLORIDE 3 MILLILITER(S): 9 INJECTION INTRAMUSCULAR; INTRAVENOUS; SUBCUTANEOUS at 06:58

## 2023-08-18 RX ADMIN — Medication 15 MILLIGRAM(S): at 16:40

## 2023-08-18 RX ADMIN — Medication 975 MILLIGRAM(S): at 07:48

## 2023-08-18 RX ADMIN — HEPARIN SODIUM 5000 UNIT(S): 5000 INJECTION INTRAVENOUS; SUBCUTANEOUS at 09:20

## 2023-08-18 RX ADMIN — HEPARIN SODIUM 5000 UNIT(S): 5000 INJECTION INTRAVENOUS; SUBCUTANEOUS at 00:40

## 2023-08-18 RX ADMIN — Medication 15 MILLIGRAM(S): at 09:19

## 2023-08-18 RX ADMIN — LIDOCAINE 1 APPLICATION(S): 4 CREAM TOPICAL at 01:19

## 2023-08-18 RX ADMIN — Medication 15 MILLIGRAM(S): at 10:19

## 2023-08-18 RX ADMIN — Medication 975 MILLIGRAM(S): at 01:30

## 2023-08-18 RX ADMIN — Medication 975 MILLIGRAM(S): at 06:57

## 2023-08-18 NOTE — DISCHARGE NOTE NURSING/CASE MANAGEMENT/SOCIAL WORK - NSDCPNINST_GEN_ALL_CORE
Make a follow up appointment with Dr. Cavazos. Call MD if you develop a fever, or if there is redness, swelling, drainage or pain not relieved by pain medication. No heavy lifting, bending, or straining to move your bowels. Take over the counter stool softeners as needed to prevent constipation which may be caused by pain medication. Hernandez care as instructed. DRink plenty of liquids. Your A1C= 8.0. Continue to follow a consistent carbohydrate diet and take your medications for diabetes.

## 2023-08-18 NOTE — PROGRESS NOTE ADULT - ASSESSMENT
63 yo M s/p RALP/LND 8/17/2023 8/18: pain controlled, tolerating CLD, no N/V, + flatus, urine yellow    Plan:  -IVM  -reg diet  -continue hold losartan  -montes teaching  -DVT prophy, IS, OOB, ambulate  -d/c home later today with montes to f/u with Dr. Cavazos

## 2023-08-18 NOTE — DISCHARGE NOTE PROVIDER - CARE PROVIDERS DIRECT ADDRESSES
[email or web address]

## 2023-08-18 NOTE — DISCHARGE NOTE PROVIDER - NSDCCPCAREPLAN_GEN_ALL_CORE_FT
PRINCIPAL DISCHARGE DIAGNOSIS  Diagnosis: Malignant neoplasm of prostate  Assessment and Plan of Treatment: Empty montes bag as needed as instructed.  Keep hydrated.  No heavy lifting or straining for 4 to 6 weeks, avoid constipation. You may have intermittent pink tinged urine and small amounts of leakage around montes (due to bladder spasms).  This is normal.   If your urine becomes bright red or with clots, or there is no urine in the bag, please call the office. You may shower, just pat white strips dry, they will fall off in a few weeks  Dr. Cavazos's office will call you to schedule a follow up appointment next week for montes removal and further management.  Call the office if you have fever greater than 101, no urine in bag, pain not relieved with pain medication, nausea/vomiting.        SECONDARY DISCHARGE DIAGNOSES  Diagnosis: HTN (hypertension)  Assessment and Plan of Treatment: Do not restart your losartan, your blood pressure was normal off this medication during your hospitalization.  Please make an appointment next week with Dr. Choi for BP check and medication adjustment as indicated.    Diagnosis: Type 2 diabetes mellitus  Assessment and Plan of Treatment: Do not restart your Jardiance until Monday, 8/21

## 2023-08-18 NOTE — DISCHARGE NOTE PROVIDER - HOSPITAL COURSE
63 yo M underwent RALP/LND on 8/17/2023.  Postoperative course uneventful, pain controlled, urine remained acceptable in color, ambulating.  Return of GI function on POD #1, diet advanced without incident.  Pt d/c with montes to leg bag to f/u with Dr. Cavazos.

## 2023-08-18 NOTE — DISCHARGE NOTE NURSING/CASE MANAGEMENT/SOCIAL WORK - PATIENT PORTAL LINK FT
You can access the FollowMyHealth Patient Portal offered by [facility] by registering at the following website: [email or web address]. By joining [employer]’s FollowMyHealth portal, you will also be able to view your health information using other applications (apps) compatible with our system.

## 2023-08-18 NOTE — DISCHARGE NOTE NURSING/CASE MANAGEMENT/SOCIAL WORK - NSDCPEFALRISK_GEN_ALL_CORE
For information on Fall & Injury Prevention, visit: [email or web address] OR  [email or web address] OR  https://www.cdc.gov/steadi/patient.html

## 2023-08-18 NOTE — DISCHARGE NOTE PROVIDER - CARE PROVIDER_API CALL
Adonis Cavazos  Urology  [address], [address]  [place] [address]  Phone: [phone]  Fax: [phone]  Follow Up Time:     Francisco Choi  Internal Medicine  [address], [address] [place] [address]  Phone: [phone]  Fax: [phone]  Follow Up Time:

## 2023-08-18 NOTE — PROGRESS NOTE ADULT - SUBJECTIVE AND OBJECTIVE BOX
Overnight events:  None    Subjective:  Pt offers no complaints, tolerating CLD, no N/V, + flatus    Objective:    Vital signs  T(C): , Max: 37.3 (08-18-23 @ 01:37)  HR: 67 (08-18-23 @ 09:22)  BP: 107/58 (08-18-23 @ 09:22)  SpO2: 98% (08-18-23 @ 09:22)  Wt(kg): --    Output   Simon: 400 yellow  08-17 @ 07:01  -  08-18 @ 07:00  --------------------------------------------------------  IN: 250 mL / OUT: 645 mL / NET: -395 mL    08-18 @ 07:01  -  08-18 @ 10:38  --------------------------------------------------------  IN: 0 mL / OUT: 125 mL / NET: -125 mL        Gen: NAD  Abd: ken c/d/i, soft, nontender, nondistended  : simon secured    Labs: none today

## 2023-08-24 LAB — SURGICAL PATHOLOGY STUDY: SIGNIFICANT CHANGE UP

## 2023-08-28 ENCOUNTER — APPOINTMENT (OUTPATIENT)
Dept: UROLOGY | Facility: CLINIC | Age: 63
End: 2023-08-28
Payer: COMMERCIAL

## 2023-08-28 VITALS — DIASTOLIC BLOOD PRESSURE: 74 MMHG | SYSTOLIC BLOOD PRESSURE: 120 MMHG | HEART RATE: 93 BPM

## 2023-08-28 PROCEDURE — 99024 POSTOP FOLLOW-UP VISIT: CPT

## 2023-08-29 ENCOUNTER — NON-APPOINTMENT (OUTPATIENT)
Age: 63
End: 2023-08-29

## 2023-08-30 ENCOUNTER — OUTPATIENT (OUTPATIENT)
Dept: OUTPATIENT SERVICES | Facility: HOSPITAL | Age: 63
LOS: 1 days | End: 2023-08-30
Payer: COMMERCIAL

## 2023-08-30 ENCOUNTER — APPOINTMENT (OUTPATIENT)
Dept: UROLOGY | Facility: CLINIC | Age: 63
End: 2023-08-30
Payer: COMMERCIAL

## 2023-08-30 ENCOUNTER — NON-APPOINTMENT (OUTPATIENT)
Age: 63
End: 2023-08-30

## 2023-08-30 VITALS — SYSTOLIC BLOOD PRESSURE: 118 MMHG | DIASTOLIC BLOOD PRESSURE: 73 MMHG | HEART RATE: 98 BPM

## 2023-08-30 DIAGNOSIS — Z98.890 OTHER SPECIFIED POSTPROCEDURAL STATES: Chronic | ICD-10-CM

## 2023-08-30 PROCEDURE — 99024 POSTOP FOLLOW-UP VISIT: CPT

## 2023-08-30 PROCEDURE — 52000 CYSTOURETHROSCOPY: CPT

## 2023-08-30 PROCEDURE — 52000 CYSTOURETHROSCOPY: CPT | Mod: 58

## 2023-08-30 RX ORDER — CIPROFLOXACIN HYDROCHLORIDE 500 MG/1
500 TABLET, FILM COATED ORAL
Refills: 0 | Status: COMPLETED | OUTPATIENT
Start: 2023-08-30

## 2023-08-30 RX ORDER — CIPROFLOXACIN HYDROCHLORIDE 500 MG/1
500 TABLET, FILM COATED ORAL
Qty: 2 | Refills: 0 | Status: ACTIVE | OUTPATIENT
Start: 2023-08-30

## 2023-08-30 NOTE — REASON FOR VISIT
[Follow-up Visit ___] : a follow-up visit  for [unfilled] [Emergent Follow-Up] : an emergent follow-up visit for prostate cancer [Spouse] : spouse

## 2023-08-31 RX ORDER — SITAGLIPTIN 100 MG/1
100 TABLET, FILM COATED ORAL
Qty: 90 | Refills: 0 | Status: ACTIVE | COMMUNITY
Start: 2023-07-18

## 2023-08-31 RX ORDER — FINASTERIDE 5 MG/1
5 TABLET, FILM COATED ORAL
Qty: 90 | Refills: 3 | Status: COMPLETED | COMMUNITY
Start: 2019-11-05 | End: 2023-08-31

## 2023-08-31 RX ORDER — EMPAGLIFLOZIN 10 MG/1
10 TABLET, FILM COATED ORAL
Qty: 90 | Refills: 0 | Status: ACTIVE | COMMUNITY
Start: 2023-08-07

## 2023-08-31 RX ORDER — FLASH GLUCOSE SENSOR
KIT MISCELLANEOUS
Qty: 2 | Refills: 0 | Status: COMPLETED | COMMUNITY
Start: 2023-07-11

## 2023-08-31 RX ORDER — FLASH GLUCOSE SCANNING READER
EACH MISCELLANEOUS
Qty: 1 | Refills: 0 | Status: ACTIVE | COMMUNITY
Start: 2023-07-11

## 2023-08-31 NOTE — HISTORY OF PRESENT ILLNESS
[FreeTextEntry1] : s/p Robotic RP 8/17/2023. No post op complications.  Hernandez catheter removed yesterday. Comes to the office today with complaints of clear fluid drainage from his left lateral incision.  Patient reports voiding normally with incontinence.  Nocturia 3-4 times last night.  No gross hematuria, no abdominal pain or flank pain.  No fever/chills, no nausea/vomiting.

## 2023-09-09 NOTE — DISEASE MANAGEMENT
[1] : T1 [c] : c [10-20] : 10 - 20 ng/mL [Biopsy with Fusion] : Patient had a biopsy with fusion on [8] : Template Biopsy Jake Score: 8 [Biopsy results sent to PCP/Referring Physician] : Biopsy results sent to PCP/Referring Physician [IIC] : IIC [Radical Prostatectomy] : Radical Prostatectomy [Patient had a radical prostatectomy] : Patient had a radical prostatectomy  [7(4+3)] : Jake Score 7(4+3) [Positive] : Positive margins [3] : T3 [a] : a [0] : N0 [] : Patient had no Bone Scan performed [TotalCores] : 14 [BiopsyDate] : 3/21/2023 [TotalPositiveCores] : 4 [MaxCoreInvolvement] : 40% [FreeTextEntry7] : MRI prostate 2/10/2023 37 mL volume, PIRAD 4 Right PZpm, PIRAD 4 Left PZpm.\par  PSA at diagnosis 7.95 ng/mL on finasteride.\par  \par  PSMA PET/CT 4/17/2023: uptake in the prostate, no metastatic disease. [TotalNumberofnodesresected] : 17 [RadicalProstatectomyDate] : 8/17/2023 [PositiveNodes] : 0

## 2023-09-09 NOTE — DISEASE MANAGEMENT
[1] : T1 [c] : c [10-20] : 10 - 20 ng/mL [Biopsy with Fusion] : Patient had a biopsy with fusion on [8] : Template Biopsy Jake Score: 8 [Biopsy results sent to PCP/Referring Physician] : Biopsy results sent to PCP/Referring Physician [IIC] : IIC [Radical Prostatectomy] : Radical Prostatectomy [Patient had a radical prostatectomy] : Patient had a radical prostatectomy  [7(4+3)] : Jake Score 7(4+3) [Positive] : Positive margins [3] : T3 [a] : a [0] : N0 [] : Patient had no Bone Scan performed [TotalCores] : 14 [BiopsyDate] : 3/21/2023 [TotalPositiveCores] : 4 [MaxCoreInvolvement] : 40% [FreeTextEntry7] : MRI prostate 2/10/2023 37 mL volume, PIRAD 4 Right PZpm, PIRAD 4 Left PZpm.\par  PSA at diagnosis 7.95 ng/mL on finasteride.\par  \par  PSMA PET/CT 4/17/2023: uptake in the prostate, no metastatic disease. [RadicalProstatectomyDate] : 8/17/2023 [TotalNumberofnodesresected] : 17 [PositiveNodes] : 0

## 2023-09-09 NOTE — PHYSICAL EXAM
fine motor coordination training
[de-identified] : Indwelling Hernandez, swelling to scrotum 
[de-identified] : Indwelling Hernandez, swelling to scrotum

## 2023-09-09 NOTE — HISTORY OF PRESENT ILLNESS
[FreeTextEntry1] : s/p Robotic RP 8/17/2023. No post op complications. Denies any fever or chills. Incisions healing   Here today for catheter removal.  Tolerating diet, ambulating w/o difficulty.

## 2023-09-11 DIAGNOSIS — C61 MALIGNANT NEOPLASM OF PROSTATE: ICD-10-CM

## 2023-10-11 ENCOUNTER — LABORATORY RESULT (OUTPATIENT)
Age: 63
End: 2023-10-11

## 2023-10-19 ENCOUNTER — APPOINTMENT (OUTPATIENT)
Dept: UROLOGY | Facility: CLINIC | Age: 63
End: 2023-10-19
Payer: COMMERCIAL

## 2023-10-19 VITALS
BODY MASS INDEX: 28.64 KG/M2 | HEIGHT: 68 IN | DIASTOLIC BLOOD PRESSURE: 64 MMHG | WEIGHT: 189 LBS | RESPIRATION RATE: 16 BRPM | HEART RATE: 96 BPM | SYSTOLIC BLOOD PRESSURE: 102 MMHG

## 2023-10-19 PROCEDURE — 99024 POSTOP FOLLOW-UP VISIT: CPT

## 2023-10-24 ENCOUNTER — NON-APPOINTMENT (OUTPATIENT)
Age: 63
End: 2023-10-24

## 2024-01-04 ENCOUNTER — RX RENEWAL (OUTPATIENT)
Age: 64
End: 2024-01-04

## 2024-01-05 ENCOUNTER — NON-APPOINTMENT (OUTPATIENT)
Age: 64
End: 2024-01-05

## 2024-01-05 RX ORDER — TADALAFIL 5 MG/1
5 TABLET ORAL
Qty: 90 | Refills: 3 | Status: DISCONTINUED | COMMUNITY
Start: 2024-01-05 | End: 2024-01-05

## 2024-01-05 RX ORDER — TADALAFIL 5 MG/1
5 TABLET ORAL
Qty: 90 | Refills: 3 | Status: ACTIVE | COMMUNITY
Start: 2024-01-05 | End: 1900-01-01

## 2024-01-22 ENCOUNTER — APPOINTMENT (OUTPATIENT)
Dept: UROLOGY | Facility: CLINIC | Age: 64
End: 2024-01-22
Payer: COMMERCIAL

## 2024-01-22 DIAGNOSIS — N52.31 ERECTILE DYSFUNCTION FOLLOWING RADICAL PROSTATECTOMY: ICD-10-CM

## 2024-01-22 DIAGNOSIS — C61 MALIGNANT NEOPLASM OF PROSTATE: ICD-10-CM

## 2024-01-22 LAB — PSA SERPL-MCNC: <0.01 NG/ML

## 2024-01-22 PROCEDURE — G2211 COMPLEX E/M VISIT ADD ON: CPT

## 2024-01-22 PROCEDURE — 99212 OFFICE O/P EST SF 10 MIN: CPT

## 2024-02-16 PROBLEM — C61 PROSTATE CANCER: Status: ACTIVE | Noted: 2023-03-31

## 2024-02-16 PROBLEM — N52.31 ERECTILE DYSFUNCTION FOLLOWING RADICAL PROSTATECTOMY: Status: ACTIVE | Noted: 2023-10-19

## 2024-02-16 NOTE — DISEASE MANAGEMENT
[1] : T1 [c] : c [10-20] : 10 - 20 ng/mL [Biopsy with Fusion] : Patient had a biopsy with fusion on [8] : Template Biopsy Jake Score: 8 [Biopsy results sent to PCP/Referring Physician] : Biopsy results sent to PCP/Referring Physician [IIC] : IIC [Radical Prostatectomy] : Radical Prostatectomy [Patient had a radical prostatectomy] : Patient had a radical prostatectomy  [7(4+3)] : Jake Score 7(4+3) [Positive] : Positive margins [a] : a [3] : T3 [0] : N0 [] : Patient had no Bone Scan performed [BiopsyDate] : 3/21/2023 [TotalCores] : 14 [TotalPositiveCores] : 4 [MaxCoreInvolvement] : 40% [FreeTextEntry7] : MRI prostate 2/10/2023 37 mL volume, PIRAD 4 Right PZpm, PIRAD 4 Left PZpm.\par  PSA at diagnosis 7.95 ng/mL on finasteride.\par  \par  PSMA PET/CT 4/17/2023: uptake in the prostate, no metastatic disease. [RadicalProstatectomyDate] : 8/17/2023 [TotalNumberofnodesresected] : 17 [PositiveNodes] : 0

## 2024-02-16 NOTE — HISTORY OF PRESENT ILLNESS
[FreeTextEntry1] : s/p RALP 8/17/2023 Overall, doing well. Reports good flow, mild straining/pushing to completely empty his bladder. Denies stress incontinence, hematuria or dysuria. Denies constipation.   PSA 01/17/2024: <0.01 ng/mL  No significant return of erections on tadalafil 5 mg without improvement.

## 2024-03-26 RX ORDER — TADALAFIL 5 MG/1
5 TABLET ORAL
Qty: 90 | Refills: 0 | Status: ACTIVE | COMMUNITY
Start: 2023-10-19 | End: 1900-01-01

## 2024-03-27 NOTE — HISTORY OF PRESENT ILLNESS
[FreeTextEntry1] : s/p Robotic RP 8/17/2023. No post op complications. Denies any fever or chills. Incisions healing   Here today for catheter removal.  Tolerating diet, ambulating w/o difficulty. Ambulatory

## 2024-07-22 ENCOUNTER — APPOINTMENT (OUTPATIENT)
Dept: UROLOGY | Facility: CLINIC | Age: 64
End: 2024-07-22

## 2024-07-22 VITALS — DIASTOLIC BLOOD PRESSURE: 82 MMHG | HEART RATE: 92 BPM | SYSTOLIC BLOOD PRESSURE: 130 MMHG

## 2024-07-22 DIAGNOSIS — N52.31 ERECTILE DYSFUNCTION FOLLOWING RADICAL PROSTATECTOMY: ICD-10-CM

## 2024-07-22 DIAGNOSIS — C61 MALIGNANT NEOPLASM OF PROSTATE: ICD-10-CM

## 2024-07-22 LAB — PSA SERPL-MCNC: <0.01 NG/ML

## 2024-07-22 PROCEDURE — G2211 COMPLEX E/M VISIT ADD ON: CPT | Mod: NC

## 2024-07-22 PROCEDURE — 99213 OFFICE O/P EST LOW 20 MIN: CPT

## 2024-07-22 RX ORDER — TADALAFIL 20 MG/1
20 TABLET ORAL
Qty: 30 | Refills: 6 | Status: ACTIVE | COMMUNITY
Start: 2024-07-22 | End: 1900-01-01

## 2024-07-22 NOTE — PHYSICAL EXAM
[General Appearance - In No Acute Distress] : in no acute distress [] : no respiratory distress [Abdomen Soft] : soft [Normal] : no focal deficits [Oriented To Time, Place, And Person] : oriented to person, place, and time

## 2024-07-23 NOTE — DISEASE MANAGEMENT
[1] : T1 [c] : c [10-20] : 10 - 20 ng/mL [Biopsy with Fusion] : Patient had a biopsy with fusion on [8] : Template Biopsy Jake Score: 8 [Biopsy results sent to PCP/Referring Physician] : Biopsy results sent to PCP/Referring Physician [IIC] : IIC [Radical Prostatectomy] : Radical Prostatectomy [Patient had a radical prostatectomy] : Patient had a radical prostatectomy  [7(4+3)] : Jake Score 7(4+3) [Positive] : Positive margins [3] : T3 [a] : a [0] : N0 [] : Patient had no Bone Scan performed [BiopsyDate] : 3/21/2023 [TotalCores] : 14 [TotalPositiveCores] : 4 [MaxCoreInvolvement] : 40% [FreeTextEntry7] : MRI prostate 2/10/2023 37 mL volume, PIRAD 4 Right PZpm, PIRAD 4 Left PZpm.\par  PSA at diagnosis 7.95 ng/mL on finasteride.\par  \par  PSMA PET/CT 4/17/2023: uptake in the prostate, no metastatic disease. [RadicalProstatectomyDate] : 8/17/2023 [TotalNumberofnodesresected] : 17 [PositiveNodes] : 0

## 2024-07-23 NOTE — HISTORY OF PRESENT ILLNESS
[FreeTextEntry1] : s/p RALP 8/17/2023 Overall, doing well.  Reports good flow, mild straining/pushing to completely empty his bladder. Denies stress incontinence, hematuria or dysuria. Denies constipation.  PSA 07/15/2024: <0.01 ng/mL  No significant return of erections on tadalafil 5 mg without improvement.

## 2024-09-06 NOTE — PHYSICAL EXAM
[General Appearance - Well Developed] : well developed [General Appearance - Well Nourished] : well nourished [Normal Appearance] : normal appearance [Well Groomed] : well groomed [General Appearance - In No Acute Distress] : no acute distress [Abdomen Soft] : soft [Abdomen Tenderness] : non-tender [Costovertebral Angle Tenderness] : no ~M costovertebral angle tenderness [Urinary Bladder Findings] : the bladder was normal on palpation [No Prostate Nodules] : no prostate nodules [Prostate Size ___ gm] : prostate size [unfilled] gm [Edema] : no peripheral edema [] : no respiratory distress [Respiration, Rhythm And Depth] : normal respiratory rhythm and effort [Exaggerated Use Of Accessory Muscles For Inspiration] : no accessory muscle use [Normal Station and Gait] : the gait and station were normal for the patient's age There are no Wet Read(s) to document.

## 2025-07-17 ENCOUNTER — LABORATORY RESULT (OUTPATIENT)
Age: 65
End: 2025-07-17

## 2025-07-22 ENCOUNTER — NON-APPOINTMENT (OUTPATIENT)
Age: 65
End: 2025-07-22

## 2025-07-24 ENCOUNTER — NON-APPOINTMENT (OUTPATIENT)
Age: 65
End: 2025-07-24

## 2025-07-24 ENCOUNTER — APPOINTMENT (OUTPATIENT)
Dept: UROLOGY | Facility: CLINIC | Age: 65
End: 2025-07-24
Payer: COMMERCIAL

## 2025-07-24 VITALS
SYSTOLIC BLOOD PRESSURE: 120 MMHG | BODY MASS INDEX: 28.79 KG/M2 | RESPIRATION RATE: 17 BRPM | DIASTOLIC BLOOD PRESSURE: 70 MMHG | WEIGHT: 190 LBS | HEART RATE: 90 BPM | TEMPERATURE: 98.1 F | HEIGHT: 68 IN

## 2025-07-24 DIAGNOSIS — N52.31 ERECTILE DYSFUNCTION FOLLOWING RADICAL PROSTATECTOMY: ICD-10-CM

## 2025-07-24 DIAGNOSIS — C61 MALIGNANT NEOPLASM OF PROSTATE: ICD-10-CM

## 2025-07-24 PROCEDURE — 99213 OFFICE O/P EST LOW 20 MIN: CPT

## 2025-07-24 PROCEDURE — G2211 COMPLEX E/M VISIT ADD ON: CPT | Mod: NC

## (undated) DEVICE — XI TIP COVER

## (undated) DEVICE — TIP METZENBAUM SCISSOR MONOPOLAR ENDOCUT (ORANGE)

## (undated) DEVICE — SUT VICRYL 3-0 27" RB-1 UNDYED

## (undated) DEVICE — SUT VICRYL 0 27" UR-6

## (undated) DEVICE — TROCAR SURGIQUEST AIRSEAL 12MMX100MM

## (undated) DEVICE — ENDOCATCH 10MM SPECIMEN POUCH

## (undated) DEVICE — BLADE SCALPEL SAFETYLOCK #15

## (undated) DEVICE — XI ARM FORCEP MARYLAND BIPOLAR

## (undated) DEVICE — POSITIONER PURPLE ARM ONE STEP (LARGE)

## (undated) DEVICE — BAG URINE W METER 2L

## (undated) DEVICE — SUT VLOC 90 3-0 6" CV-23 UNDYED

## (undated) DEVICE — INSUFFLATION NDL COVIDIEN SURGINEEDLE VERESS 120MM

## (undated) DEVICE — DRSG TEGADERM 2.5X3"

## (undated) DEVICE — XI ARM NEEDLE DRIVER LARGE

## (undated) DEVICE — SUT VLOC 90 3-0 6" CV-23 VIOLET

## (undated) DEVICE — SUT POLYSORB 0 60" TIES UNDYED

## (undated) DEVICE — XI ARM FORCEP PROGRASP 8MM

## (undated) DEVICE — PREP BETADINE SPONGE STICKS

## (undated) DEVICE — GLV 8 PROTEXIS (CREAM) MICRO

## (undated) DEVICE — XI DRAPE COLUMN

## (undated) DEVICE — PACK PERI GYN

## (undated) DEVICE — FOLEY CATH 2-WAY 20F 5CC LATEX LUBRICATH

## (undated) DEVICE — GOWN XL

## (undated) DEVICE — Device

## (undated) DEVICE — FOLEY CATH 2-WAY 20FR 5CC SILASTIC

## (undated) DEVICE — PACK ROBOTIC LIJ

## (undated) DEVICE — SOL IRR BAG NS 0.9% 3000ML

## (undated) DEVICE — TROCAR COVIDIEN VERSAONE BLADELESS FIXATION 5MM STANDARD

## (undated) DEVICE — GOWN XXXL

## (undated) DEVICE — SOL IRR BAG H2O 3000ML

## (undated) DEVICE — DRAIN RESERVOIR FOR JACKSON PRATT 100CC CARDINAL

## (undated) DEVICE — POSITIONER FOAM HEAD CRADLE (PINK)

## (undated) DEVICE — SUT VICRYL 2-0 27" SH UNDYED

## (undated) DEVICE — LUBRICATING JELLY ONESHOT 1.25OZ

## (undated) DEVICE — XI OBTURATOR OPTICAL BLADELESS 8MM

## (undated) DEVICE — WARMING BLANKET UPPER ADULT

## (undated) DEVICE — POSITIONER PINK PAD PIGAZZI SYSTEM

## (undated) DEVICE — XI ARM CLIP APPLIER LARGE

## (undated) DEVICE — SYR LUER LOK 10CC

## (undated) DEVICE — TUBING STRYKEFLOW II SUCTION / IRRIGATOR

## (undated) DEVICE — TUBING AIRSEAL TRI-LUMEN FILTERED

## (undated) DEVICE — D HELP - CLEARVIEW CLEARIFY SYSTEM

## (undated) DEVICE — ELCTR GROUNDING PAD ADULT COVIDIEN

## (undated) DEVICE — FOLEY CATH 2-WAY 16FR 5CC SILICONE

## (undated) DEVICE — XI SEAL UNIV 5- 8 MM

## (undated) DEVICE — DRAPE IOBAN 23" X 23"

## (undated) DEVICE — SUT VLOC 180 0 6" GS-21 GREEN

## (undated) DEVICE — ELCTR BOVIE PENCIL SMOKE EVACUATION

## (undated) DEVICE — XI ARM SCISSOR MONO CURVED

## (undated) DEVICE — VENODYNE/SCD SLEEVE CALF MEDIUM

## (undated) DEVICE — SYR CATH TIP 2 OZ

## (undated) DEVICE — SUT CAPROSYN 4-0 P-12 UNDYED

## (undated) DEVICE — XI DRAPE ARM

## (undated) DEVICE — POSITIONER STRAP ARMBOARD VELCRO TS-30

## (undated) DEVICE — GLV 7.5 PROTEXIS (CREAM) MICRO